# Patient Record
Sex: FEMALE | Race: WHITE | Employment: FULL TIME | URBAN - METROPOLITAN AREA
[De-identification: names, ages, dates, MRNs, and addresses within clinical notes are randomized per-mention and may not be internally consistent; named-entity substitution may affect disease eponyms.]

---

## 2020-07-19 ENCOUNTER — HOSPITAL ENCOUNTER (EMERGENCY)
Age: 45
Discharge: HOME | End: 2020-07-19
Attending: EMERGENCY MEDICINE
Payer: COMMERCIAL

## 2020-07-19 VITALS
HEART RATE: 88 BPM | TEMPERATURE: 98.8 F | RESPIRATION RATE: 16 BRPM | DIASTOLIC BLOOD PRESSURE: 94 MMHG | SYSTOLIC BLOOD PRESSURE: 146 MMHG | OXYGEN SATURATION: 99 % | HEIGHT: 66 IN | BODY MASS INDEX: 34.87 KG/M2 | WEIGHT: 217 LBS

## 2020-07-19 LAB
A/G RATIO: 1.8 (ref 1.1–2.2)
ALBUMIN SERPL-MCNC: 4.6 G/DL (ref 3.4–5)
ALP BLD-CCNC: 56 U/L (ref 40–129)
ALT SERPL-CCNC: 10 U/L (ref 10–40)
AMORPHOUS: ABNORMAL /HPF
ANION GAP SERPL CALCULATED.3IONS-SCNC: 9 MMOL/L (ref 3–16)
AST SERPL-CCNC: 11 U/L (ref 15–37)
BACTERIA: ABNORMAL /HPF
BASOPHILS ABSOLUTE: 0 K/UL (ref 0–0.2)
BASOPHILS RELATIVE PERCENT: 0.5 %
BILIRUB SERPL-MCNC: 1.1 MG/DL (ref 0–1)
BILIRUBIN URINE: NEGATIVE
BLOOD, URINE: ABNORMAL
BUN BLDV-MCNC: 10 MG/DL (ref 7–20)
CALCIUM SERPL-MCNC: 9.2 MG/DL (ref 8.3–10.6)
CHLORIDE BLD-SCNC: 105 MMOL/L (ref 99–110)
CLARITY: ABNORMAL
CO2: 24 MMOL/L (ref 21–32)
COLOR: ABNORMAL
CREAT SERPL-MCNC: 0.8 MG/DL (ref 0.6–1.1)
EOSINOPHILS ABSOLUTE: 0 K/UL (ref 0–0.6)
EOSINOPHILS RELATIVE PERCENT: 0.7 %
EPITHELIAL CELLS, UA: ABNORMAL /HPF (ref 0–5)
FINE CASTS, UA: ABNORMAL /LPF (ref 0–2)
GFR AFRICAN AMERICAN: >60
GFR NON-AFRICAN AMERICAN: >60
GLOBULIN: 2.5 G/DL
GLUCOSE BLD-MCNC: 111 MG/DL (ref 70–99)
GLUCOSE URINE: NEGATIVE MG/DL
HCG QUALITATIVE: NEGATIVE
HCT VFR BLD CALC: 40.3 % (ref 36–48)
HEMOGLOBIN: 13.3 G/DL (ref 12–16)
KETONES, URINE: NEGATIVE MG/DL
LEUKOCYTE ESTERASE, URINE: ABNORMAL
LIPASE: 26 U/L (ref 13–60)
LYMPHOCYTES ABSOLUTE: 0.9 K/UL (ref 1–5.1)
LYMPHOCYTES RELATIVE PERCENT: 16.5 %
MCH RBC QN AUTO: 25.9 PG (ref 26–34)
MCHC RBC AUTO-ENTMCNC: 32.9 G/DL (ref 31–36)
MCV RBC AUTO: 78.7 FL (ref 80–100)
MICROSCOPIC EXAMINATION: YES
MONOCYTES ABSOLUTE: 0.4 K/UL (ref 0–1.3)
MONOCYTES RELATIVE PERCENT: 6.8 %
MUCUS: ABNORMAL /LPF
NEUTROPHILS ABSOLUTE: 4.2 K/UL (ref 1.7–7.7)
NEUTROPHILS RELATIVE PERCENT: 75.5 %
NITRITE, URINE: NEGATIVE
PDW BLD-RTO: 13.6 % (ref 12.4–15.4)
PH UA: 5.5 (ref 5–8)
PLATELET # BLD: 233 K/UL (ref 135–450)
PMV BLD AUTO: 9.6 FL (ref 5–10.5)
POTASSIUM SERPL-SCNC: 3.8 MMOL/L (ref 3.5–5.1)
PROTEIN UA: NEGATIVE MG/DL
RBC # BLD: 5.12 M/UL (ref 4–5.2)
RBC UA: ABNORMAL /HPF (ref 0–4)
SODIUM BLD-SCNC: 138 MMOL/L (ref 136–145)
SPECIFIC GRAVITY UA: >=1.03 (ref 1–1.03)
TOTAL PROTEIN: 7.1 G/DL (ref 6.4–8.2)
URINE TYPE: ABNORMAL
UROBILINOGEN, URINE: 0.2 E.U./DL
WBC # BLD: 5.6 K/UL (ref 4–11)
WBC UA: ABNORMAL /HPF (ref 0–5)

## 2020-07-19 PROCEDURE — 84703 CHORIONIC GONADOTROPIN ASSAY: CPT

## 2020-07-19 PROCEDURE — 85025 COMPLETE CBC W/AUTO DIFF WBC: CPT

## 2020-07-19 PROCEDURE — 51798 US URINE CAPACITY MEASURE: CPT

## 2020-07-19 PROCEDURE — 80053 COMPREHEN METABOLIC PANEL: CPT

## 2020-07-19 PROCEDURE — 99283 EMERGENCY DEPT VISIT LOW MDM: CPT

## 2020-07-19 PROCEDURE — 83690 ASSAY OF LIPASE: CPT

## 2020-07-19 PROCEDURE — 81001 URINALYSIS AUTO W/SCOPE: CPT

## 2020-07-19 RX ORDER — LOSARTAN POTASSIUM 25 MG/1
25 TABLET ORAL DAILY
COMMUNITY

## 2020-07-19 RX ORDER — DICYCLOMINE HCL 20 MG
20 TABLET ORAL ONCE
Status: DISCONTINUED | OUTPATIENT
Start: 2020-07-19 | End: 2020-07-19 | Stop reason: HOSPADM

## 2020-07-19 RX ORDER — DICYCLOMINE HCL 20 MG
20 TABLET ORAL EVERY 6 HOURS
Qty: 40 TABLET | Refills: 0 | Status: SHIPPED | OUTPATIENT
Start: 2020-07-19

## 2020-07-19 ASSESSMENT — PAIN SCALES - GENERAL: PAINLEVEL_OUTOF10: 5

## 2020-07-19 ASSESSMENT — PAIN DESCRIPTION - ORIENTATION: ORIENTATION: UPPER

## 2020-07-19 ASSESSMENT — PAIN DESCRIPTION - PAIN TYPE: TYPE: ACUTE PAIN

## 2020-07-19 ASSESSMENT — PAIN DESCRIPTION - LOCATION: LOCATION: ABDOMEN

## 2020-07-19 NOTE — ED PROVIDER NOTES
CHIEF COMPLAINT  Abdominal Pain (sx since Thursday); Nausea; and Urinary Frequency      HISTORY OF PRESENT ILLNESS  Irma Mcallister is a 45 y.o. female with a history of hypertension who presents to the ED complaining of abdominal pain. Patient reports that since Thursday she has noted some nausea and urinary frequency. Patient describes pain in the suprapubic region as well as upper lateral aspects. No fevers, chills, or sweats. No diarrhea. Patient denies any vaginal complaints. .   No other complaints, modifying factors or associated symptoms. I have reviewed the following from the nursing documentation. Past Medical History:   Diagnosis Date    Hypertension      Past Surgical History:   Procedure Laterality Date    WISDOM TOOTH EXTRACTION       History reviewed. No pertinent family history.   Social History     Socioeconomic History    Marital status: Single     Spouse name: Not on file    Number of children: Not on file    Years of education: Not on file    Highest education level: Not on file   Occupational History    Not on file   Social Needs    Financial resource strain: Not on file    Food insecurity     Worry: Not on file     Inability: Not on file    Transportation needs     Medical: Not on file     Non-medical: Not on file   Tobacco Use    Smoking status: Never Smoker    Smokeless tobacco: Never Used   Substance and Sexual Activity    Alcohol use: No    Drug use: No    Sexual activity: Not on file   Lifestyle    Physical activity     Days per week: Not on file     Minutes per session: Not on file    Stress: Not on file   Relationships    Social connections     Talks on phone: Not on file     Gets together: Not on file     Attends Hinduism service: Not on file     Active member of club or organization: Not on file     Attends meetings of clubs or organizations: Not on file     Relationship status: Not on file    Intimate partner violence     Fear of current or ex partner: Not on file     Emotionally abused: Not on file     Physically abused: Not on file     Forced sexual activity: Not on file   Other Topics Concern    Not on file   Social History Narrative    Not on file     Current Facility-Administered Medications   Medication Dose Route Frequency Provider Last Rate Last Dose    dicyclomine (BENTYL) tablet 20 mg  20 mg Oral Once Jerral Rung, DO         Current Outpatient Medications   Medication Sig Dispense Refill    losartan (COZAAR) 25 MG tablet Take 25 mg by mouth daily       Allergies   Allergen Reactions    Sulfa Antibiotics Nausea Only       REVIEW OF SYSTEMS  10 systems reviewed, pertinent positives per HPI otherwise noted to be negative. PHYSICAL EXAM  BP (!) 159/94   Pulse 91   Temp 98.8 °F (37.1 °C) (Oral)   Resp 16   Ht 5' 6\" (1.676 m)   Wt 217 lb (98.4 kg)   SpO2 99%   BMI 35.02 kg/m²   GENERAL APPEARANCE: Awake and alert. Cooperative. No acute distress. HEAD: Normocephalic. Atraumatic. EYES: PERRL. EOM's grossly intact. ENT: Mucous membranes are moist.   NECK: Supple, trachea midline. HEART: RRR. Normal S1S2, no rubs, gallops, or murmurs noted  LUNGS: Respirations unlabored. CTAB. Good air exchange. No wheezes, rales, or rhonchi. Speaking comfortably in full sentences. ABDOMEN: Soft. Non-distended. Non-tender. No CVA tenderness. No guarding or rebound. Normal bowel sounds. EXTREMITIES: No peripheral edema. MAEE. No acute deformities. SKIN: Warm and dry. No acute rashes. NEUROLOGICAL: Alert and oriented X 3. CN II-XII intact. No gross facial drooping. Strength 5/5, sensation intact. Normal coordination. No pronator drift. Gait normal.   PSYCHIATRIC: Normal mood and affect. LABS  I have reviewed all labs for this visit. Results for orders placed or performed during the hospital encounter of 07/19/20   Urinalysis   Result Value Ref Range    Urine Type NotGiven              Rechecks: Physical assessment performed.       1151: Patient with minimal symptoms at this time. Bladder scan: Less than 100 cc post void. ED COURSE/MDM  Patient seen and evaluated. Old records reviewed. Labs and imaging reviewed and results discussed with patient. Patient was offered Bentyl, but declined. She remained stable throughout her stay in the emergency department. Patient was reassessed as noted above . No acute pathology was noted and pt is safe for discharge home to follow up with primary care, and urology. .  Patient's labs including urinalysis are stable. I discussed potential imaging with patient. We agreed that at this time imaging would not be warranted and she does not desire formal imaging either way. Patient agrees to follow-up with PCP/urology plan of care discussed with patient and family. Patient and family in agreement with plan. Patient was given scripts for the following medications. I counseled patient how to take these medications. Discharge Medication List as of 7/19/2020 11:28 AM      START taking these medications    Details   dicyclomine (BENTYL) 20 MG tablet Take 1 tablet by mouth every 6 hours, Disp-40 tablet,R-0Print             CLINICAL IMPRESSION  1. Abdominal pain, unspecified abdominal location    2. Urinary urgency    3. Elevated blood pressure reading        Blood pressure (!) 159/94, pulse 91, temperature 98.8 °F (37.1 °C), temperature source Oral, resp. rate 16, height 5' 6\" (1.676 m), weight 217 lb (98.4 kg), SpO2 99 %. Aure Ayala was discharged to home in stable condition.         Luis Angel Alvarez,   07/19/20 [id number]

## 2024-01-22 LAB
BASOPHILS ABSOLUTE: 0 THOU/MCL (ref 0–0.2)
BASOPHILS ABSOLUTE: 1 %
C-REACTIVE PROTEIN WIDE RANGE: <3 MG/L
EOSINOPHILS ABSOLUTE: 0.1 THOU/MCL (ref 0.03–0.45)
EOSINOPHILS RELATIVE PERCENT: 1 %
FERRITIN: 14 NG/ML (ref 13–150)
FOLATE: >16 NG/ML
HCT VFR BLD CALC: 41 % (ref 36–46)
HEMOGLOBIN: 13.7 G/DL (ref 12–15.2)
IRON % SATURATION: 26 % (ref 20–50)
IRON: 111 MCG/DL (ref 30–160)
LYMPHOCYTES ABSOLUTE: 1.3 THOU/MCL (ref 1–4)
LYMPHOCYTES RELATIVE PERCENT: 18 %
Lab: 6.3 % (ref 3.1–17.6)
MCH RBC QN AUTO: 25.7 PG (ref 27–33)
MCHC RBC AUTO-ENTMCNC: 33.3 G/DL (ref 32–36)
MCV RBC AUTO: 77.2 FL (ref 82–97)
MONOCYTES # BLD: 5 %
MONOCYTES ABSOLUTE: 0.4 THOU/MCL (ref 0.2–0.9)
NEUTROPHILS ABSOLUTE: 5.3 THOU/MCL (ref 1.8–7.7)
PDW BLD-RTO: 16 % (ref 12.3–17)
PLATELET # BLD: 199 THOU/MCL (ref 140–375)
PMV BLD AUTO: 8.8 FL (ref 7.4–11.5)
RBC # BLD: 5.32 MIL/MCL (ref 3.8–5.2)
RETIC HEMOGLOBIN: 31 PG (ref 28–38)
RETICULOCYTE ABSOLUTE COUNT: 0.9 % (ref 0.9–2.5)
RETICULOCYTE ABSOLUTE COUNT: 47 THOU/MCL (ref 40–110)
SEG NEUTROPHILS: 75 %
TOTAL IRON BINDING CAPACITY: 423 MCG/DL (ref 250–400)
TRANSFERRIN: 302 MG/DL (ref 200–360)
VITAMIN B-12: 456 PG/ML (ref 232–1245)
VITAMIN D 25-HYDROXY: 9.2 NG/ML (ref 30–150)
WBC # BLD: 7.1 THOU/MCL (ref 3.6–10.5)

## 2024-03-04 ENCOUNTER — HOSPITAL ENCOUNTER (OUTPATIENT)
Dept: PHYSICAL THERAPY | Age: 49
Setting detail: THERAPIES SERIES
Discharge: HOME | End: 2024-03-04
Payer: COMMERCIAL

## 2024-03-04 PROCEDURE — 97162 PT EVAL MOD COMPLEX 30 MIN: CPT

## 2024-03-04 NOTE — THERAPY EVALUATION
[facility]- Outpatient Rehabilitation and Therapy  [address] [place] [address] office: [phone] fax: [phone]     Physical Therapy Certification      Dear Julianne Guerin,     We had the pleasure of evaluating the following patient for physical therapy services at [facility].  A summary of our findings can be found in the initial assessment below.  This includes our plan of care.  If you have any questions or concerns regarding these findings, please do not hesitate to contact me at the office phone number listed above.  Thank you for the referral.     Physician Signature:_______________________________Date:__________________  By signing above (or electronic signature), therapist’s plan is approved by physician       Initial Evaluation   Patient: Irma Cavanaugh (48 y.o. female)   Examination Date: 2024   :  1975 MRN: [id number]   Visit #: 1    Insurance: Payor: [employer] / Plan: [facility] SHARED SERVICES / Product Type: *No Product type* /   Insurance ID: [id number] - (Commercial)  Secondary Insurance (if applicable):    Treatment Diagnosis: R K' Pain     Medical Diagnosis:  Pain in right knee [M25.561]  Other chronic pain [G89.29]   Referring Physician: Julianne Guerin, *  PCP: Cheyenne Garcia MD                              Precautions/ Contra-indications:           Latex allergy:  NO  Pacemaker:    NO  Contraindications for Manipulation: None  Date of Surgery: N/A  Other:     Red Flags:  None    C-SSRS Triggered by Intake questionnaire:   [x] No, Questionnaire did not trigger screening.   [] Yes, Patient intake triggered further evaluation      [] C-SSRS Screening completed  [] PCP notified via Plan of Care  [] Emergency services notified     Preferred Language for Healthcare:   [x] English       [] other:    SUBJECTIVE EXAMINATION     Patient stated complaint: patient has been having R knee pain for years

## 2024-03-04 NOTE — FLOWSHEET NOTE
[facility]- Outpatient Rehabilitation and Therapy  [address], [place] [address] office: [phone] fax: [phone]      Physical Therapy: TREATMENT/PROGRESS NOTE   Patient: Irma Cavanaugh (48 y.o. female)   Treatment Date: 2024   :  1975 MRN: [id number]   Visit #: 1   Insurance Allowable Auth Needed   12 []Yes    []No    Insurance: Payor: [employer] / Plan: [facility] SHARED SERVICES / Product Type: *No Product type* /   Insurance ID: [id number] - (Commercial)  Secondary Insurance (if applicable):    Treatment Diagnosis: R K' pain   Medical Diagnosis:    Pain in right knee [M25.561]  Other chronic pain [G89.29]   Referring Physician: Julianne Guerin, *  PCP: Cheyenne Garcia MD                             Plan of care signed: NO    Date of Patient follow up with Physician:      Progress Report/POC: EVAL today  POC update due: (10 visits /OR AUTH LIMITS, whichever is less) 10 4/3/2024     Precautions/ Contra-indications:                                                                                          Latex allergy:  NO  Pacemaker:    NO  Contraindications for Manipulation: None  Date of Surgery: N/A  Other:      Red Flags:  None    Preferred Language for Healthcare:   [x]English       []other:    SUBJECTIVE EXAMINATION     Patient Report/Comments: patient has been having R knee pain for years (~10-15). She used to only experience pain after exercise but her symptoms have progressed to constant pain. As a result, she is not exercising at all right now because it is too painful. Patient notes gait deviations to cope with the pain. Patient has had PT for this knee in the past but it did not resolve.She reports weakness in the R leg causing her to feel \"shaky\" . Standing has become more difficult and causes fatigue. She is limited to about 10-15 minutes before pain and fatigue intensifies. Sitting is not limited by pain. Walking is almost

## 2024-03-07 ENCOUNTER — HOSPITAL ENCOUNTER (OUTPATIENT)
Dept: PHYSICAL THERAPY | Age: 49
Setting detail: THERAPIES SERIES
Discharge: HOME | End: 2024-03-07
Payer: COMMERCIAL

## 2024-03-07 PROCEDURE — 97110 THERAPEUTIC EXERCISES: CPT

## 2024-03-07 PROCEDURE — 97140 MANUAL THERAPY 1/> REGIONS: CPT

## 2024-03-07 PROCEDURE — 97112 NEUROMUSCULAR REEDUCATION: CPT

## 2024-03-07 NOTE — FLOWSHEET NOTE
goals for Patient:   Short Term Goals: To be achieved in: 2 weeks  Independent in HEP and progression per patient tolerance, in order to progress toward full function and prevent re-injury.               Status: [] Progressing: [] Met: [] Not Met: [] Adjusted  Patient will have a decrease in pain to 1/10 to help facilitate improvement in movement, function, and ADLs as indicated by functional deficits.              Status: [] Progressing: [] Met: [] Not Met: [] Adjusted     Long Term Goals: To be achieved in:  8-12  weeks  Disability index score of 15% or less for the LEFS to assist with return top prior level of function.                  Status: [] Progressing: [] Met: [] Not Met: [] Adjusted  Improve hip AROM ER and IR to WFL to allow for proper joint functioning as indicated by patients functional deficits.  Status: [] Progressing: [] Met: [] Not Met: [] Adjusted  3. Pt to improve strength to 4-/5 or better of hip abductors and external rotators to allow for proper muscle and joint use in functional mobility, ADLs and prior level of function  Status: [] Progressing: [] Met: [] Not Met: [] Adjusted  4. Patient will be able to walk for at least 20 minute without increased pain or fatigue to work towards return to prior level of function.  Status: [] Progressing: [] Met: [] Not Met: [] Adjusted  5. Patient will return to a personal exercise program without increased symptoms and with carryover of therapeutic interventions to promote long-term improvement.                                                                                                                   Status: [] Progressing: [] Met: [] Not Met: [] Adjusted       Overall Progression Towards Functional goals/ Treatment Progress Update:  [] Patient is progressing as expected towards functional goals listed.    [] Progression is slowed due to complexities/Impairments listed.  [] Progression has been slowed due to co-morbidities.  [x] Plan just

## 2024-03-11 ENCOUNTER — HOSPITAL ENCOUNTER (OUTPATIENT)
Dept: PHYSICAL THERAPY | Age: 49
Setting detail: THERAPIES SERIES
Discharge: HOME | End: 2024-03-11
Payer: COMMERCIAL

## 2024-03-11 PROCEDURE — 97110 THERAPEUTIC EXERCISES: CPT

## 2024-03-11 PROCEDURE — 97140 MANUAL THERAPY 1/> REGIONS: CPT

## 2024-03-11 PROCEDURE — 97112 NEUROMUSCULAR REEDUCATION: CPT

## 2024-03-11 NOTE — FLOWSHEET NOTE
hip extension    HEP                  TG  NV            Manual Intervention (02227)  TIME     B Tibiofemoral distraction; oscillations; IR/ER mobs.  EOB distraction, IR/ER mobs.  Patellar mobs R.  20 mins                                 NMR re-education (16092) resistance Sets/time Reps CUES NEEDED   Hip IR/ER                                   Therapeutic Activity (89992)  Sets/time                                          Modalities:    No modalities applied this session    Education/Home Exercise Program: TA set + marching + BKFO; bridging; clamshells      ASSESSMENT     Today's Assessment:  Pt takes more time with exercises in order to feel the correct muscles working. However, she has a great understanding of the exercises and is able to make improvements in strength and progressions in intensity.     Medical Necessity Documentation:  I certify that this patient meets the below criteria necessary for medical necessity for care and/or justification of therapy services:  The patient has functional impairments and/or activity limitations and would benefit from continued outpatient therapy services to address the deficits outlined in the patients goals  The patient has a musculoskeletal condition(s) with a corresponding ICD-10 code that is of complexity and severity that require skilled therapeutic intervention. This has a direct and significant impact on the need for therapy and significantly impacts the rate of recovery.     Treatment/Activity Tolerance:  [x] Patient tolerated treatment well [] Patient limited by fatique  [] Patient limited by pain  [] Patient limited by other medical complications  [] Other:     Return to Play: NA    Prognosis for POC: [x] Good [] Fair  [] Poor    Patient requires continued skilled intervention: [x] Yes  [] No    GOALS     Patient stated goal: Return to walking without compensation and return to personal exercise.   Status: [] Progressing: [] Met: [] Not Met: [] Adjusted

## 2024-03-14 ENCOUNTER — HOSPITAL ENCOUNTER (OUTPATIENT)
Dept: PHYSICAL THERAPY | Age: 49
Setting detail: THERAPIES SERIES
Discharge: HOME | End: 2024-03-14
Payer: COMMERCIAL

## 2024-03-14 PROCEDURE — 97110 THERAPEUTIC EXERCISES: CPT

## 2024-03-14 PROCEDURE — 97112 NEUROMUSCULAR REEDUCATION: CPT

## 2024-03-14 PROCEDURE — 97140 MANUAL THERAPY 1/> REGIONS: CPT

## 2024-03-14 NOTE — FLOWSHEET NOTE
[facility]- Outpatient Rehabilitation and Therapy  [address], [place] [address] office: [phone] fax: [phone]      Physical Therapy: TREATMENT/PROGRESS NOTE   Patient: Irma Cavanaugh (48 y.o. female)   Treatment Date: 2024   :  1975 MRN: [id number]   Visit #: 4   Insurance Allowable Auth Needed   12 []Yes    [x]No    Insurance: Payor: [employer] / Plan: [facility] SHARED SERVICES / Product Type: *No Product type* /   Insurance ID: [id number] - (Commercial)  Secondary Insurance (if applicable):    Treatment Diagnosis: R K' pain   Medical Diagnosis:    Pain in right knee [M25.561]  Other chronic pain [G89.29]   Referring Physician: Julianne Guerin, *  PCP: Cheyenne Garcia MD                             Plan of care signed: NO    Date of Patient follow up with Physician:      Progress Report/POC: EVAL today  POC update due: (10 visits /OR AUTH LIMITS, whichever is less) 10 2024     Precautions/ Contra-indications:                                                                                          Latex allergy:  NO  Pacemaker:    NO  Contraindications for Manipulation: None  Date of Surgery: N/A  Other:      Red Flags:  None    Preferred Language for Healthcare:   [x]English       []other:    SUBJECTIVE EXAMINATION     Patient Report/Comments: patient has some soreness in her R leg due to walking in the woods briefly since last session. Otherwise she feels okay.      Test used Initial score  3/4/24   Pain Summary VAS 3-7/10   Functional questionnaire LEFS 29%   Other:              OBJECTIVE EXAMINATION     Observation: See Eval    Test measurements: See Eval    Exercises/Interventions:     Therapeutic Ex (77651)  resistance Sets/time Reps Notes/Cues/Progressions   TA set      Marching      BKFO     10  10 HEP    Bridge  10x5 secs   HEp    Clamshell   15 ea    Mini squat  2 10 Dowel kelton training   Prone hip extension   10 ea  HEP            
What Is The Reason For Today's Visit?: Full Body Skin Examination
What Is The Reason For Today's Visit? (Being Monitored For X): concerning skin lesions on an annual basis
How Severe Are Your Spot(S)?: mild

## 2024-03-18 ENCOUNTER — HOSPITAL ENCOUNTER (OUTPATIENT)
Dept: PHYSICAL THERAPY | Age: 49
Setting detail: THERAPIES SERIES
Discharge: HOME | End: 2024-03-18
Payer: COMMERCIAL

## 2024-03-18 PROCEDURE — 97140 MANUAL THERAPY 1/> REGIONS: CPT

## 2024-03-18 PROCEDURE — 97112 NEUROMUSCULAR REEDUCATION: CPT

## 2024-03-18 PROCEDURE — 97110 THERAPEUTIC EXERCISES: CPT

## 2024-03-21 ENCOUNTER — HOSPITAL ENCOUNTER (OUTPATIENT)
Dept: PHYSICAL THERAPY | Age: 49
Setting detail: THERAPIES SERIES
Discharge: HOME | End: 2024-03-21
Payer: COMMERCIAL

## 2024-03-21 PROCEDURE — 97140 MANUAL THERAPY 1/> REGIONS: CPT

## 2024-03-21 PROCEDURE — 97112 NEUROMUSCULAR REEDUCATION: CPT

## 2024-03-21 PROCEDURE — 97110 THERAPEUTIC EXERCISES: CPT

## 2024-03-21 NOTE — FLOWSHEET NOTE
[facility]- Outpatient Rehabilitation and Therapy  [address], [place] [address] office: [phone] fax: [phone]      Physical Therapy: TREATMENT/PROGRESS NOTE   Patient: Irma Cavanaugh (48 y.o. female)   Treatment Date: 2024   :  1975 MRN: [id number]   Visit #: 6   Insurance Allowable Auth Needed   12 []Yes    [x]No    Insurance: Payor: [employer] / Plan: [facility] SHARED SERVICES / Product Type: *No Product type* /   Insurance ID: [id number] - (Commercial)  Secondary Insurance (if applicable):    Treatment Diagnosis: R K' pain   Medical Diagnosis:    Pain in right knee [M25.561]  Other chronic pain [G89.29]   Referring Physician: Julianne Guerin, *  PCP: Cheyenne Garcia MD                             Plan of care signed: NO    Date of Patient follow up with Physician:      Progress Report/POC: EVAL today  POC update due: (10 visits /OR AUTH LIMITS, whichever is less) 10 2024     Precautions/ Contra-indications:                                                                                          Latex allergy:  NO  Pacemaker:    NO  Contraindications for Manipulation: None  Date of Surgery: N/A  Other:      Red Flags:  None    Preferred Language for Healthcare:   [x]English       []other:    SUBJECTIVE EXAMINATION     Patient Report/Comments: patient reports doing much better than last Monday. Soreness has significantly decreased.     Test used Initial score  3/4/24   Pain Summary VAS 3-7/10   Functional questionnaire LEFS 29%   Other:              OBJECTIVE EXAMINATION     Observation: See Eval    Test measurements: See Eval    Exercises/Interventions:     Therapeutic Ex (52746)  resistance Sets/time Reps Notes/Cues/Progressions   TA set      Marching      BKFO     10  10 HEP    Bridge  10x5 secs   HEp + abd + red band   Clamshell   15 ea    Mini squat   10  5 Dowel kelton training; bow  squat   Prone hip extension   10 HEP    Resisted

## 2024-03-25 ENCOUNTER — HOSPITAL ENCOUNTER (OUTPATIENT)
Dept: PHYSICAL THERAPY | Age: 49
Setting detail: THERAPIES SERIES
Discharge: HOME | End: 2024-03-25
Payer: COMMERCIAL

## 2024-03-25 PROCEDURE — 97112 NEUROMUSCULAR REEDUCATION: CPT

## 2024-03-25 PROCEDURE — 97140 MANUAL THERAPY 1/> REGIONS: CPT

## 2024-03-25 PROCEDURE — 97110 THERAPEUTIC EXERCISES: CPT

## 2024-03-25 NOTE — FLOWSHEET NOTE
[facility]- Outpatient Rehabilitation and Therapy  [address], [place] [address] office: [phone] fax: [phone]      Physical Therapy: TREATMENT/PROGRESS NOTE   Patient: Irma Cavanaugh (48 y.o. female)   Treatment Date: 2024   :  1975 MRN: [id number]   Visit #: 7   Insurance Allowable Auth Needed   12 []Yes    [x]No    Insurance: Payor: [employer] / Plan: [facility] SHARED SERVICES / Product Type: *No Product type* /   Insurance ID: [id number] - (Commercial)  Secondary Insurance (if applicable):    Treatment Diagnosis: R K' pain   Medical Diagnosis:    Pain in right knee [M25.561]  Other chronic pain [G89.29]   Referring Physician: Julianne Guerin, *  PCP: Cheyenne Garcia MD                             Plan of care signed: NO    Date of Patient follow up with Physician:      Progress Report/POC: EVAL today  POC update due: (10 visits /OR AUTH LIMITS, whichever is less) 10 2024     Precautions/ Contra-indications:                                                                                          Latex allergy:  NO  Pacemaker:    NO  Contraindications for Manipulation: None  Date of Surgery: N/A  Other:      Red Flags:  None    Preferred Language for Healthcare:   [x]English       []other:    SUBJECTIVE EXAMINATION     Patient Report/Comments: Pt reports doing pretty good but was tight over the weekend since the therapy.  Pt did a convention and walk/stood a couple of hours with resulting increased in pain in the R distal adductor/medial quad above the knee.      Test used Initial score  3/4/24 3/25/23   Pain Summary VAS 3-7/10 3/10   Functional questionnaire LEFS 29%    Other:                OBJECTIVE EXAMINATION     Observation:     Test measurements: See Eval    Exercises/Interventions:     Therapeutic Ex (18260)  resistance Sets/time Reps Notes/Cues/Progressions   TA set      Marching      BKFO    HEP    Bridge    HEp + abd + red band

## 2024-03-28 ENCOUNTER — HOSPITAL ENCOUNTER (OUTPATIENT)
Dept: PHYSICAL THERAPY | Age: 49
Setting detail: THERAPIES SERIES
Discharge: HOME | End: 2024-03-28
Payer: COMMERCIAL

## 2024-03-28 PROCEDURE — 97110 THERAPEUTIC EXERCISES: CPT

## 2024-03-28 PROCEDURE — 97112 NEUROMUSCULAR REEDUCATION: CPT

## 2024-03-28 PROCEDURE — 97140 MANUAL THERAPY 1/> REGIONS: CPT

## 2024-03-28 NOTE — FLOWSHEET NOTE
[facility]- Outpatient Rehabilitation and Therapy  [address], [place] [address] office: [phone] fax: [phone]      Physical Therapy: TREATMENT/PROGRESS NOTE   Patient: Irma Cavanaugh (48 y.o. female)   Treatment Date: 2024   :  1975 MRN: [id number]   Visit #: 8   Insurance Allowable Auth Needed   12 []Yes    [x]No    Insurance: Payor: [employer] / Plan: [facility] SHARED SERVICES / Product Type: *No Product type* /   Insurance ID: [id number] - (Commercial)  Secondary Insurance (if applicable):    Treatment Diagnosis: R K' pain   Medical Diagnosis:    Pain in right knee [M25.561]  Other chronic pain [G89.29]   Referring Physician: Julianne Guerin, *  PCP: Cheyenne Garcia MD                             Plan of care signed: NO    Date of Patient follow up with Physician:      Progress Report/POC: EVAL today  POC update due: (10 visits /OR AUTH LIMITS, whichever is less) 10 2024     Precautions/ Contra-indications:                                                                                          Latex allergy:  NO  Pacemaker:    NO  Contraindications for Manipulation: None  Date of Surgery: N/A  Other:      Red Flags:  None    Preferred Language for Healthcare:   [x]English       []other:    SUBJECTIVE EXAMINATION     Patient Report/Comments: Pt says her L knee has had infrequent bouts of pain since last visit but this is less frequent than it usually is. Overall, she has less stiffness and soreness after exercise.      Test used Initial score  3/4/24 3/25/23   Pain Summary VAS 3-710 3/10   Functional questionnaire LEFS 29%    Other:                OBJECTIVE EXAMINATION     Observation:     Test measurements: See Eval    Exercises/Interventions:     Therapeutic Ex (37126)  resistance Sets/time Reps Notes/Cues/Progressions   TA set      Marching      BKFO    HEP    Bridge    HEp + abd + red band   Clamshell   10 ea    Mini squat   10 Cue

## 2024-04-03 ENCOUNTER — HOSPITAL ENCOUNTER (OUTPATIENT)
Dept: PHYSICAL THERAPY | Age: 49
Setting detail: THERAPIES SERIES
Discharge: HOME | End: 2024-04-03
Payer: COMMERCIAL

## 2024-04-03 PROCEDURE — 97110 THERAPEUTIC EXERCISES: CPT

## 2024-04-03 PROCEDURE — 97112 NEUROMUSCULAR REEDUCATION: CPT

## 2024-04-03 PROCEDURE — 97140 MANUAL THERAPY 1/> REGIONS: CPT

## 2024-04-03 NOTE — FLOWSHEET NOTE
[facility]- Outpatient Rehabilitation and Therapy  [address], [place] [address] office: [phone] fax: [phone]      Physical Therapy: TREATMENT/PROGRESS NOTE   Patient: Irma Cavanaugh (48 y.o. female)   Treatment Date: 2024   :  1975 MRN: [id number]   Visit #: 9   Insurance Allowable Auth Needed   12 []Yes    [x]No    Insurance: Payor: [employer] / Plan: [facility] SHARED SERVICES / Product Type: *No Product type* /   Insurance ID: [id number] - (Commercial)  Secondary Insurance (if applicable):    Treatment Diagnosis: R K' pain   Medical Diagnosis:    Pain in right knee [M25.561]  Other chronic pain [G89.29]   Referring Physician: Julianne Guerin, *  PCP: Cheyenne Garcia MD                             Plan of care signed: NO    Date of Patient follow up with Physician:      Progress Report/POC: EVAL today  POC update due: (10 visits /OR AUTH LIMITS, whichever is less) 10 5/3/2024     Precautions/ Contra-indications:                                                                                          Latex allergy:  NO  Pacemaker:    NO  Contraindications for Manipulation: None  Date of Surgery: N/A  Other:      Red Flags:  None    Preferred Language for Healthcare:   [x]English       []other:    SUBJECTIVE EXAMINATION     Patient Report/Comments:  Pt reports feeling pretty good.  Knee is feeling much better.  Pt hasn't been walking longer distances so she hasn't tested it.       Test used Initial score  3/4/24 4/3/24   Pain Summary VAS 3-7/10 3/10   Functional questionnaire LEFS 29%    Other:                OBJECTIVE EXAMINATION     Observation:     Test measurements: See Eval    Exercises/Interventions:     Therapeutic Ex (22643)  resistance Sets/time Reps Notes/Cues/Progressions   3 way ball compression  5 secs  10 Forward and diagonal each   Bridge  5 secs 10 HEp + abd + red band   Clamshell supine alternating  5 secs 10 Green TB   Mini

## 2024-04-05 ENCOUNTER — HOSPITAL ENCOUNTER (OUTPATIENT)
Dept: PHYSICAL THERAPY | Age: 49
Setting detail: THERAPIES SERIES
Discharge: HOME | End: 2024-04-05
Payer: COMMERCIAL

## 2024-04-05 PROCEDURE — 97110 THERAPEUTIC EXERCISES: CPT

## 2024-04-05 PROCEDURE — 97112 NEUROMUSCULAR REEDUCATION: CPT

## 2024-04-05 PROCEDURE — 97140 MANUAL THERAPY 1/> REGIONS: CPT

## 2024-04-05 NOTE — FLOWSHEET NOTE
[facility]- [facility]  [address], [place] [address] office: [phone] fax: [phone]      Physical Therapy: TREATMENT/PROGRESS NOTE   Patient: Irma Cavanaugh (48 y.o. female)   Treatment Date: 2024   :  1975 MRN: [id number]   Visit #: 10   Insurance Allowable Auth Needed   12 []Yes    [x]No    Insurance: Payor: [employer] / Plan: [facility] SHARED SERVICES / Product Type: *No Product type* /   Insurance ID: [id number] - (Commercial)  Secondary Insurance (if applicable):    Treatment Diagnosis: R K' pain   Medical Diagnosis:    Pain in right knee [M25.561]  Other chronic pain [G89.29]   Referring Physician: Julianne Guerin, *  PCP: Cheyenne Garcia MD                             Plan of care signed: NO    Date of Patient follow up with Physician:      Progress Report/POC: EVAL today  POC update due: (10 visits /OR AUTH LIMITS, whichever is less) 10 5/3/2024     Precautions/ Contra-indications:                                                                                          Latex allergy:  NO  Pacemaker:    NO  Contraindications for Manipulation: None  Date of Surgery: N/A  Other:      Red Flags:  None    Preferred Language for Healthcare:   [x]English       []other:    SUBJECTIVE EXAMINATION     Patient Report/Comments:  Pt reports feeling pretty good.  Pt notes 'a little more soreness after last session' but nothing major.      Test used Initial score  3/4/24 45   Pain Summary VAS 3-7/10 210   Functional questionnaire LEFS 29%    Other:                OBJECTIVE EXAMINATION     Observation:     Test measurements:   Tenderness and fullness R QL  Hypomobility B hip joints, decreased B hip IR    Exercises/Interventions:     Therapeutic Ex (19399)  resistance Sets/time Reps Notes/Cues/Progressions   3 way ball compression  5 secs  10 Forward and diagonal each   Bridge  5 secs 10 Green TB    Clamshell supine

## 2024-04-08 ENCOUNTER — APPOINTMENT (OUTPATIENT)
Dept: PHYSICAL THERAPY | Age: 49
End: 2024-04-08
Payer: COMMERCIAL

## 2024-04-11 ENCOUNTER — APPOINTMENT (OUTPATIENT)
Dept: PHYSICAL THERAPY | Age: 49
End: 2024-04-11
Payer: COMMERCIAL

## 2024-04-24 ENCOUNTER — HOSPITAL ENCOUNTER (OUTPATIENT)
Dept: PHYSICAL THERAPY | Age: 49
Setting detail: THERAPIES SERIES
Discharge: HOME | End: 2024-04-24
Payer: COMMERCIAL

## 2024-04-24 PROCEDURE — 97112 NEUROMUSCULAR REEDUCATION: CPT

## 2024-04-24 PROCEDURE — 97140 MANUAL THERAPY 1/> REGIONS: CPT

## 2024-04-24 PROCEDURE — 97110 THERAPEUTIC EXERCISES: CPT

## 2024-04-24 NOTE — FLOWSHEET NOTE
[facility]- Outpatient Rehabilitation and Therapy  [address], [place] [address] office: [phone] fax: [phone]      Physical Therapy: TREATMENT/PROGRESS NOTE   Patient: Irma Cavanaugh (48 y.o. female)   Treatment Date: 2024   :  1975 MRN: [id number]   Visit #: 11   Insurance Allowable Auth Needed   12 []Yes    [x]No    Insurance: Payor: [employer] / Plan: [facility] SHARED SERVICES / Product Type: *No Product type* /   Insurance ID: [id number] - (Commercial)  Secondary Insurance (if applicable):    Treatment Diagnosis: R K' pain   Medical Diagnosis:    Pain in right knee [M25.561]  Other chronic pain [G89.29]   Referring Physician: Julianne Guerin, *  PCP: Cheyenne Garcia MD                             Plan of care signed: NO    Date of Patient follow up with Physician:      Progress Report/POC: EVAL today  POC update due: (10 visits /OR AUTH LIMITS, whichever is less) 10 2024     Precautions/ Contra-indications:                                                                                          Latex allergy:  NO  Pacemaker:    NO  Contraindications for Manipulation: None  Date of Surgery: N/A  Other:      Red Flags:  None    Preferred Language for Healthcare:   [x]English       []other:    SUBJECTIVE EXAMINATION     Patient Report/Comments:  Pt reports feeling pretty good.  Pt hasn't been to therapy for the past 3 weeks.  Pt traveled to [place] for 3 days without increased pain.  Pt states she did a 'fair' amount of walking and got 'tired' but no real pain.       Test used Initial score  3/4/24 4/24/24   Pain Summary VAS 3-7/10 2-3/10   Functional questionnaire LEFS 29% 31% disability    Other:                OBJECTIVE EXAMINATION     Observation:     Test measurements:   Tenderness and fullness R QL  Hypomobility B hip joints, decreased B hip IR    Exercises/Interventions:     Therapeutic Ex (99181)  resistance Sets/time Reps

## 2024-04-26 ENCOUNTER — HOSPITAL ENCOUNTER (OUTPATIENT)
Dept: PHYSICAL THERAPY | Age: 49
Setting detail: THERAPIES SERIES
Discharge: HOME | End: 2024-04-26
Payer: COMMERCIAL

## 2024-04-26 PROCEDURE — 97112 NEUROMUSCULAR REEDUCATION: CPT

## 2024-04-26 PROCEDURE — 97110 THERAPEUTIC EXERCISES: CPT

## 2024-04-26 PROCEDURE — 97140 MANUAL THERAPY 1/> REGIONS: CPT

## 2024-04-26 NOTE — FLOWSHEET NOTE
significantly impacts the rate of recovery.     Treatment/Activity Tolerance:  [x] Patient tolerated treatment well [] Patient limited by fatique  [] Patient limited by pain  [] Patient limited by other medical complications  [] Other:     Return to Play: NA    Prognosis for POC: [x] Good [] Fair  [] Poor    Patient requires continued skilled intervention: [x] Yes  [] No    GOALS     Patient stated goal: Return to walking without compensation and return to personal exercise.   Status: [] Progressing: [] Met: [] Not Met: [] Adjusted     Therapist goals for Patient:   Short Term Goals: To be achieved in: 2 weeks  Independent in HEP and progression per patient tolerance, in order to progress toward full function and prevent re-injury.               Status: [] Progressing: [] Met: [] Not Met: [] Adjusted  Patient will have a decrease in pain to 1/10 to help facilitate improvement in movement, function, and ADLs as indicated by functional deficits.              Status: [] Progressing: [] Met: [] Not Met: [] Adjusted     Long Term Goals: To be achieved in:  8-12  weeks  Disability index score of 15% or less for the LEFS to assist with return top prior level of function.                  Status: [] Progressing: [] Met: [] Not Met: [] Adjusted  Improve hip AROM ER and IR to WFL to allow for proper joint functioning as indicated by patients functional deficits.  Status: [] Progressing: [] Met: [] Not Met: [] Adjusted  3. Pt to improve strength to 4-/5 or better of hip abductors and external rotators to allow for proper muscle and joint use in functional mobility, ADLs and prior level of function  Status: [] Progressing: [] Met: [] Not Met: [] Adjusted  4. Patient will be able to walk for at least 20 minute without increased pain or fatigue to work towards return to prior level of function.  Status: [] Progressing: [] Met: [] Not Met: [] Adjusted  5. Patient will return to a personal exercise program without increased

## 2024-04-29 ENCOUNTER — HOSPITAL ENCOUNTER (OUTPATIENT)
Dept: PHYSICAL THERAPY | Age: 49
Setting detail: THERAPIES SERIES
Discharge: HOME | End: 2024-04-29
Payer: COMMERCIAL

## 2024-04-29 PROCEDURE — 97112 NEUROMUSCULAR REEDUCATION: CPT

## 2024-04-29 PROCEDURE — 97110 THERAPEUTIC EXERCISES: CPT

## 2024-04-29 PROCEDURE — 97140 MANUAL THERAPY 1/> REGIONS: CPT

## 2024-04-29 NOTE — FLOWSHEET NOTE
[facility]- Outpatient Rehabilitation and Therapy  [address], [place] [address] office: [phone] fax: [phone]      Physical Therapy: TREATMENT/PROGRESS NOTE   Patient: Irma Cavanaugh (48 y.o. female)   Treatment Date: 2024   :  1975 MRN: [id number]   Visit #: 13   Insurance Allowable Auth Needed   12 []Yes    [x]No    Insurance: Payor: [employer] / Plan: [facility] SHARED SERVICES / Product Type: *No Product type* /   Insurance ID: [id number] - (Commercial)  Secondary Insurance (if applicable):    Treatment Diagnosis: R K' pain   Medical Diagnosis:    Pain in right knee [M25.561]  Other chronic pain [G89.29]   Referring Physician: Julianne Guerin, *  PCP: Cheyenne Gracia MD                             Plan of care signed: NO    Date of Patient follow up with Physician:      Progress Report/POC: EVAL today  POC update due: (10 visits /OR AUTH LIMITS, whichever is less) 10 2024     Precautions/ Contra-indications:                                                                                          Latex allergy:  NO  Pacemaker:    NO  Contraindications for Manipulation: None  Date of Surgery: N/A  Other:      Red Flags:  None    Preferred Language for Healthcare:   [x]English       []other:    SUBJECTIVE EXAMINATION     Patient Report/Comments:  Pt reports feeling pretty good.  Really feeling better since last session.  Bruising on B hips noted yesterday with insidious onset.       Test used Initial score  3/4/24 4/24/24 4/26/24   Pain Summary VAS 3-7/10 2-3/10 0/10   Functional questionnaire LEFS 29% 31% disability     Other:                  OBJECTIVE EXAMINATION     Observation:     Test measurements:   Tenderness and fullness R QL  Hypomobility B hip joints, decreased B hip IR    Exercises/Interventions:     Therapeutic Ex (27925)  resistance Sets/time Reps Notes/Cues/Progressions   3 way ball compression    Forward and diagonal each

## 2024-05-07 ENCOUNTER — HOSPITAL ENCOUNTER (OUTPATIENT)
Dept: PHYSICAL THERAPY | Age: 49
Setting detail: THERAPIES SERIES
Discharge: HOME | End: 2024-05-07
Payer: COMMERCIAL

## 2024-05-07 PROCEDURE — 97112 NEUROMUSCULAR REEDUCATION: CPT

## 2024-05-07 PROCEDURE — 97110 THERAPEUTIC EXERCISES: CPT

## 2024-05-07 PROCEDURE — 97140 MANUAL THERAPY 1/> REGIONS: CPT

## 2024-05-07 NOTE — FLOWSHEET NOTE
[facility]- Outpatient Rehabilitation and Therapy  [address], [place] [address] office: [phone] fax: [phone]      Physical Therapy: TREATMENT/PROGRESS NOTE   Patient: Irma Cavanaugh (48 y.o. female)   Treatment Date: 2024   :  1975 MRN: [id number]   Visit #: 14   Insurance Allowable Auth Needed   12 []Yes    [x]No    Insurance: Payor: [employer] / Plan: [facility] SHARED SERVICES / Product Type: *No Product type* /   Insurance ID: [id number] - (Commercial)  Secondary Insurance (if applicable):    Treatment Diagnosis: R K' pain   Medical Diagnosis:    Pain in right knee [M25.561]  Other chronic pain [G89.29]   Referring Physician: Julianne Guerin, *  PCP: Cheyenne Garcia MD                             Plan of care signed: NO    Date of Patient follow up with Physician:      Progress Report/POC: EVAL today  POC update due: (10 visits /OR AUTH LIMITS, whichever is less) 10 2024     Precautions/ Contra-indications:                                                                                          Latex allergy:  NO  Pacemaker:    NO  Contraindications for Manipulation: None  Date of Surgery: N/A  Other:      Red Flags:  None    Preferred Language for Healthcare:   [x]English       []other:    SUBJECTIVE EXAMINATION     Patient Report/Comments:  Pt reports feeling pretty good.  B hip bruising has gone away since last session.  Pt notes doing her 'walking exercise on line' has resulted in increased joint pain knees.  Pt states her work is picking up so she will be much busier.         Test used Initial score  3/4/24 4/24/24 5/7/24   Pain Summary VAS 3-7/10 2-3/10 0/10   Functional questionnaire LEFS 29% 31% disability     Other:                  OBJECTIVE EXAMINATION     Observation:     Test measurements:   Tenderness and fullness R QL  Hypomobility L hip joints, decreased L hip IR and ER     Exercises/Interventions:     Therapeutic Ex

## 2024-05-09 ENCOUNTER — APPOINTMENT (OUTPATIENT)
Dept: PHYSICAL THERAPY | Age: 49
End: 2024-05-09
Payer: COMMERCIAL

## 2024-05-13 ENCOUNTER — HOSPITAL ENCOUNTER (OUTPATIENT)
Dept: PHYSICAL THERAPY | Age: 49
Setting detail: THERAPIES SERIES
Discharge: HOME | End: 2024-05-13
Payer: COMMERCIAL

## 2024-05-13 PROCEDURE — 97112 NEUROMUSCULAR REEDUCATION: CPT

## 2024-05-13 PROCEDURE — 97110 THERAPEUTIC EXERCISES: CPT

## 2024-05-13 PROCEDURE — 97140 MANUAL THERAPY 1/> REGIONS: CPT

## 2024-05-13 NOTE — FLOWSHEET NOTE
Clamshell supine alternating    Green TB   Leg press   80#  NV Cue force through heels   hip extension with reformer red spring    Cue force through heels   Hip stacking    Cue force through heels   Hip abd   Hip ext 45#  75# 2  2 10  10    Rocker board   NV F/B M/L and rocking each   Lumbo-pelvic stabilization        Resisted walking    Focus on triple extension   DKTC with feet on TB   LTR with feet on TB    20  20    TG  6'     Multifidus  S' extension    3 secs 15  10 Maroon TB   Maroon TB    Manual Intervention (98645)  TIME     B Tibiofemoral distraction; oscillations; IR/ER mobs.  EOB distraction, IR/ER mobs gr 3-4, M/L tibio-femoral joint mobs.  Patellar mobs R.  Prone lumbar rotation mobs, PA SP mobs        STM R QL followed by manual stretching  As needed     Lumbar gapping without cavitation gr 3-4  As needed     L hip joint mobs with straps followed by hip IR/ER neuro re-ed resisted training.  R femoral AP mobs with wedge gr 4  15 mins            NMR re-education (73584) resistance Sets/time Reps CUES NEEDED   Hip IR/ER  4 mins                                 Therapeutic Activity (85484)  Sets/time                                          Modalities:    No modalities applied this session    Education/Home Exercise Program: TA set + marching + BKFO; bridging; estelita      ASSESSMENT     Today's Assessment:  Pt tolerated progression of therex today without increased pain.  Good overall tolerance to therex today without increased pain.  Continue with 1x/wk.     Medical Necessity Documentation:  I certify that this patient meets the below criteria necessary for medical necessity for care and/or justification of therapy services:  The patient has functional impairments and/or activity limitations and would benefit from continued outpatient therapy services to address the deficits outlined in the patients goals  The patient has a musculoskeletal condition(s) with a corresponding ICD-10 code that is of

## 2024-05-16 ENCOUNTER — APPOINTMENT (OUTPATIENT)
Dept: PHYSICAL THERAPY | Age: 49
End: 2024-05-16
Payer: COMMERCIAL

## 2024-05-20 ENCOUNTER — HOSPITAL ENCOUNTER (OUTPATIENT)
Dept: PHYSICAL THERAPY | Age: 49
Setting detail: THERAPIES SERIES
Discharge: HOME | End: 2024-05-20
Payer: COMMERCIAL

## 2024-05-20 PROCEDURE — 97110 THERAPEUTIC EXERCISES: CPT

## 2024-05-20 PROCEDURE — 97140 MANUAL THERAPY 1/> REGIONS: CPT

## 2024-05-20 PROCEDURE — 97112 NEUROMUSCULAR REEDUCATION: CPT

## 2024-05-20 NOTE — PROGRESS NOTES
Biofeedback, Thermal Agents, Vasoneumatic Compression, Traction, Ultrasound, Iontophoresis, Paraffin, and Whirlpool  [x] Patient education on joint protection, postural re-education, activity modification, progression of HEP.        [] Aquatic Therapy     Electronically Signed by Dre Acosta, PT              Date: 05/20/2024

## 2024-05-20 NOTE — FLOWSHEET NOTE
without increased pain or fatigue to work towards return to prior level of function.  Status: [] Progressing: [x] Met: [] Not Met: [] Adjusted  5. Patient will return to a personal exercise program without increased symptoms and with carryover of therapeutic interventions to promote long-term improvement.                                                                                                                   Status: [x] Progressing: [] Met: [] Not Met: [] Adjusted       Overall Progression Towards Functional goals/ Treatment Progress Update:  [x] Patient is progressing as expected towards functional goals listed.    [] Progression is slowed due to complexities/Impairments listed.  [] Progression has been slowed due to co-morbidities.  [] Plan just implemented, too soon (<30days) to assess goals progression   [] Goals require adjustment due to lack of progress  [] Patient is not progressing as expected and requires additional follow up with physician  [] Other:     TREATMENT PLAN   Plan: Frequency/Duration: 2x/week for  8-12  weeks for the following treatment interventions:     Interventions:  [x] Therapeutic exercise including: strength training, ROM, including postural re-education.   [x] NMR activation and proprioception, including postural re-education.    [x] Manual therapy as indicated to include: PROM, Gr I-IV mobilizations, STM, Grade V Manipulation, and Dry Needling/IASTM  [x] Modalities as needed that may include: Cryotherapy, Electrical Stimulation, Biofeedback, Thermal Agents, Vasoneumatic Compression, Traction, Ultrasound, Iontophoresis, Paraffin, and Whirlpool  [x] Patient education on joint protection, postural re-education, activity modification, progression of HEP.        [] Aquatic Therapy     Electronically Signed by Dre Acosta PT              Date: 05/20/2024

## 2024-05-30 ENCOUNTER — HOSPITAL ENCOUNTER (OUTPATIENT)
Dept: PHYSICAL THERAPY | Age: 49
Setting detail: THERAPIES SERIES
Discharge: HOME | End: 2024-05-30
Payer: COMMERCIAL

## 2024-05-30 PROCEDURE — 97110 THERAPEUTIC EXERCISES: CPT

## 2024-05-30 PROCEDURE — 97112 NEUROMUSCULAR REEDUCATION: CPT

## 2024-05-30 PROCEDURE — 97140 MANUAL THERAPY 1/> REGIONS: CPT

## 2024-06-03 ENCOUNTER — APPOINTMENT (OUTPATIENT)
Dept: PHYSICAL THERAPY | Age: 49
End: 2024-06-03
Payer: COMMERCIAL

## 2024-06-10 ENCOUNTER — APPOINTMENT (OUTPATIENT)
Dept: PHYSICAL THERAPY | Age: 49
End: 2024-06-10
Payer: COMMERCIAL

## 2024-06-17 ENCOUNTER — HOSPITAL ENCOUNTER (OUTPATIENT)
Dept: PHYSICAL THERAPY | Age: 49
Setting detail: THERAPIES SERIES
Discharge: HOME | End: 2024-06-17
Payer: COMMERCIAL

## 2024-06-17 PROCEDURE — 97110 THERAPEUTIC EXERCISES: CPT

## 2024-06-17 PROCEDURE — 97112 NEUROMUSCULAR REEDUCATION: CPT

## 2024-06-17 PROCEDURE — 97140 MANUAL THERAPY 1/> REGIONS: CPT

## 2024-06-17 NOTE — FLOWSHEET NOTE
[facility]- Outpatient Rehabilitation and Therapy  [address], [place] [address] office: [phone] fax: [phone]      Physical Therapy: TREATMENT/PROGRESS NOTE   Patient: Irma Cavanaugh (48 y.o. female)   Treatment Date: 2024   :  1975 MRN: [id number]   Visit #: 18   Insurance Allowable Auth Needed   12 []Yes    [x]No    Insurance: Payor: [employer] / Plan: [facility] SHARED SERVICES / Product Type: *No Product type* /   Insurance ID: [id number] - (Commercial)  Secondary Insurance (if applicable):    Treatment Diagnosis: R K' pain   Medical Diagnosis:    Pain in right knee [M25.561]  Other chronic pain [G89.29]   Referring Physician: Julianne Guerin, *  PCP: Cheyenne Garcia MD                             Plan of care signed: NO    Date of Patient follow up with Physician:      Progress Report/POC: EVAL today  POC update due: (10 visits /OR AUTH LIMITS, whichever is less) 10 2024     Precautions/ Contra-indications:                                                                                          Latex allergy:  NO  Pacemaker:    NO  Contraindications for Manipulation: None  Date of Surgery: N/A  Other:      Red Flags:  None    Preferred Language for Healthcare:   [x]English       []other:    SUBJECTIVE EXAMINATION     Patient Report/Comments:  Pt reports that she is feeling 'better' since her last session and has been experiencing decreased knee pain. Pt has not been able to perform HEP regularly d/t death in the family.     Test used Initial score  3/4/24 4/24/24 5/20/24 6/17/24   Pain Summary VAS 3-7/10 2-310 0-1/10 1-2/10   Functional questionnaire LEFS 29% 31% disability  7.5% disability    Other:                    OBJECTIVE EXAMINATION     Observation:     Test measurements:   Tenderness and fullness R QL  Hypomobility L hip joints, decreased L hip IR and ER     Exercises/Interventions:     Therapeutic Ex (83523)  resistance

## 2024-06-24 ENCOUNTER — APPOINTMENT (OUTPATIENT)
Dept: PHYSICAL THERAPY | Age: 49
End: 2024-06-24
Payer: COMMERCIAL

## 2024-07-01 ENCOUNTER — HOSPITAL ENCOUNTER (OUTPATIENT)
Dept: PHYSICAL THERAPY | Age: 49
Setting detail: THERAPIES SERIES
Discharge: HOME | End: 2024-07-01
Payer: COMMERCIAL

## 2024-07-01 PROCEDURE — 97112 NEUROMUSCULAR REEDUCATION: CPT

## 2024-07-01 PROCEDURE — 97140 MANUAL THERAPY 1/> REGIONS: CPT

## 2024-07-01 PROCEDURE — 97110 THERAPEUTIC EXERCISES: CPT

## 2024-07-01 NOTE — FLOWSHEET NOTE
[facility]- Outpatient Rehabilitation and Therapy  [address], [place] [address] office: [phone] fax: [phone]      Physical Therapy: TREATMENT/PROGRESS NOTE   Patient: Irma Cavanaugh (48 y.o. female)   Treatment Date: 2024   :  1975 MRN: [id number]   Visit #: 19   Insurance Allowable Auth Needed   12 []Yes    [x]No    Insurance: Payor: [employer] / Plan: [facility] SHARED SERVICES / Product Type: *No Product type* /   Insurance ID: [id number] - (Commercial)  Secondary Insurance (if applicable):    Treatment Diagnosis: R K' pain   Medical Diagnosis:    Pain in right knee [M25.561]  Other chronic pain [G89.29]   Referring Physician: Julianne Guerin, *  PCP: Cheyenne Garcia MD                             Plan of care signed: NO    Date of Patient follow up with Physician:      Progress Report/POC: EVAL today  POC update due: (10 visits /OR AUTH LIMITS, whichever is less) 10 2024     Precautions/ Contra-indications:                                                                                          Latex allergy:  NO  Pacemaker:    NO  Contraindications for Manipulation: None  Date of Surgery: N/A  Other:      Red Flags:  None    Preferred Language for Healthcare:   [x]English       []other:    SUBJECTIVE EXAMINATION     Patient Report/Comments:  Pt reports stiffness in the thighs this morning.  Pt also notes soreness in B knees for a couple of days after walking in the woods in [place].       Test used Initial score  3/4/24 4/24/24 5/20/24 6/17/24   Pain Summary VAS 3-7/10 2-3/10 0-110 1-2/10   Functional questionnaire LEFS 29% 31% disability  7.5% disability    Other:                    OBJECTIVE EXAMINATION     Observation:     Test measurements:   Tenderness and fullness R QL  Hypomobility L hip joints, decreased L hip IR and ER     Exercises/Interventions:     Therapeutic Ex (75764)  resistance Sets/time Reps

## 2024-07-08 ENCOUNTER — HOSPITAL ENCOUNTER (OUTPATIENT)
Dept: PHYSICAL THERAPY | Age: 49
Setting detail: THERAPIES SERIES
Discharge: HOME | End: 2024-07-08
Payer: COMMERCIAL

## 2024-07-08 PROCEDURE — 97112 NEUROMUSCULAR REEDUCATION: CPT

## 2024-07-08 PROCEDURE — 97110 THERAPEUTIC EXERCISES: CPT

## 2024-07-08 NOTE — FLOWSHEET NOTE
Patient will be able to walk for at least 20 minute without increased pain or fatigue to work towards return to prior level of function.  Status: [] Progressing: [x] Met: [] Not Met: [] Adjusted  5. Patient will return to a personal exercise program without increased symptoms and with carryover of therapeutic interventions to promote long-term improvement.                                                                                                                   Status: [x] Progressing: [] Met: [] Not Met: [] Adjusted       Overall Progression Towards Functional goals/ Treatment Progress Update:  [] Patient is progressing as expected towards functional goals listed.    [x] Progression is slowed due to complexities/Impairments listed.  [] Progression has been slowed due to co-morbidities.  [] Plan just implemented, too soon (<30days) to assess goals progression   [] Goals require adjustment due to lack of progress  [] Patient is not progressing as expected and requires additional follow up with physician  [] Other:     TREATMENT PLAN   Plan: Frequency/Duration: 2x/week for  8-12  weeks for the following treatment interventions:     Interventions:  [x] Therapeutic exercise including: strength training, ROM, including postural re-education.   [x] NMR activation and proprioception, including postural re-education.    [x] Manual therapy as indicated to include: PROM, Gr I-IV mobilizations, STM, Grade V Manipulation, and Dry Needling/IASTM  [x] Modalities as needed that may include: Cryotherapy, Electrical Stimulation, Biofeedback, Thermal Agents, Vasoneumatic Compression, Traction, Ultrasound, Iontophoresis, Paraffin, and Whirlpool  [x] Patient education on joint protection, postural re-education, activity modification, progression of HEP.        [] Aquatic Therapy     Electronically Signed by Dre Acosta PT              Date: 07/08/2024

## 2024-07-25 ENCOUNTER — HOSPITAL ENCOUNTER (OUTPATIENT)
Dept: PHYSICAL THERAPY | Age: 49
Setting detail: THERAPIES SERIES
Discharge: HOME | End: 2024-07-25
Payer: COMMERCIAL

## 2024-07-25 PROCEDURE — 97112 NEUROMUSCULAR REEDUCATION: CPT

## 2024-07-25 PROCEDURE — 97110 THERAPEUTIC EXERCISES: CPT

## 2024-07-25 NOTE — FLOWSHEET NOTE
services:  The patient has functional impairments and/or activity limitations and would benefit from continued outpatient therapy services to address the deficits outlined in the patients goals  The patient has a musculoskeletal condition(s) with a corresponding ICD-10 code that is of complexity and severity that require skilled therapeutic intervention. This has a direct and significant impact on the need for therapy and significantly impacts the rate of recovery.     Treatment/Activity Tolerance:  [x] Patient tolerated treatment well [] Patient limited by fatique  [] Patient limited by pain  [] Patient limited by other medical complications  [] Other:     Return to Play: NA    Prognosis for POC: [x] Good [] Fair  [] Poor    Patient requires continued skilled intervention: [x] Yes  [] No    GOALS     Patient stated goal: Return to walking without compensation and return to personal exercise.   Status: [] Progressing: [x] Met: [] Not Met: [] Adjusted     Therapist goals for Patient:   Short Term Goals: To be achieved in: 2 weeks  Independent in HEP and progression per patient tolerance, in order to progress toward full function and prevent re-injury.               Status: [] Progressing: [x] Met: [] Not Met: [] Adjusted  Patient will have a decrease in pain to 1/10 to help facilitate improvement in movement, function, and ADLs as indicated by functional deficits.              Status: [] Progressing: [x] Met: [] Not Met: [] Adjusted     Long Term Goals: To be achieved in:  8-12  weeks  Disability index score of 15% or less for the LEFS to assist with return top prior level of function.                  Status: [] Progressing: [x] Met: [] Not Met: [] Adjusted  Improve hip AROM ER and IR to WFL to allow for proper joint functioning as indicated by patients functional deficits.  Status: [] Progressing: [x] Met: [] Not Met: [] Adjusted  3. Pt to improve strength to 4-/5 or better of hip abductors and external rotators to

## 2024-07-25 NOTE — PROGRESS NOTES
Therapeutic exercise including: strength training, ROM, including postural re-education.   [x] NMR activation and proprioception, including postural re-education.    [x] Manual therapy as indicated to include: PROM, Gr I-IV mobilizations, STM, Grade V Manipulation, and Dry Needling/IASTM  [x] Modalities as needed that may include: Cryotherapy, Electrical Stimulation, Biofeedback, Thermal Agents, Vasoneumatic Compression, Traction, Ultrasound, Iontophoresis, Paraffin, and Whirlpool  [x] Patient education on joint protection, postural re-education, activity modification, progression of HEP.        [] Aquatic Therapy     Electronically Signed by Dre Acosta, PT              Date: 07/25/2024

## 2024-09-18 ENCOUNTER — APPOINTMENT (OUTPATIENT)
Dept: GENERAL RADIOLOGY | Age: 49
End: 2024-09-18
Payer: COMMERCIAL

## 2024-09-18 ENCOUNTER — HOSPITAL ENCOUNTER (EMERGENCY)
Age: 49
Discharge: HOME | End: 2024-09-18
Attending: EMERGENCY MEDICINE
Payer: COMMERCIAL

## 2024-09-18 VITALS
DIASTOLIC BLOOD PRESSURE: 95 MMHG | SYSTOLIC BLOOD PRESSURE: 151 MMHG | BODY MASS INDEX: 36.99 KG/M2 | RESPIRATION RATE: 18 BRPM | OXYGEN SATURATION: 97 % | HEART RATE: 97 BPM | WEIGHT: 229.2 LBS | TEMPERATURE: 99.1 F

## 2024-09-18 DIAGNOSIS — R13.10 DYSPHAGIA, UNSPECIFIED TYPE: Primary | ICD-10-CM

## 2024-09-18 LAB — S PYO AG THROAT QL: NEGATIVE

## 2024-09-18 PROCEDURE — 99284 EMERGENCY DEPT VISIT MOD MDM: CPT

## 2024-09-18 PROCEDURE — 70360 X-RAY EXAM OF NECK: CPT

## 2024-09-18 PROCEDURE — 87880 STREP A ASSAY W/OPTIC: CPT

## 2024-09-18 RX ORDER — AMLODIPINE BESYLATE 5 MG/1
5 TABLET ORAL DAILY
COMMUNITY

## 2024-09-18 RX ORDER — METOPROLOL SUCCINATE 25 MG/1
1 TABLET, EXTENDED RELEASE ORAL DAILY
COMMUNITY
Start: 2024-03-29

## 2024-09-18 ASSESSMENT — PAIN SCALES - GENERAL: PAINLEVEL_OUTOF10: 0

## 2024-09-18 ASSESSMENT — PAIN - FUNCTIONAL ASSESSMENT: PAIN_FUNCTIONAL_ASSESSMENT: 0-10

## 2025-06-14 ENCOUNTER — APPOINTMENT (OUTPATIENT)
Dept: CT IMAGING | Age: 50
DRG: 149 | End: 2025-06-14
Payer: COMMERCIAL

## 2025-06-14 ENCOUNTER — APPOINTMENT (OUTPATIENT)
Dept: GENERAL RADIOLOGY | Age: 50
DRG: 149 | End: 2025-06-14
Payer: COMMERCIAL

## 2025-06-14 ENCOUNTER — HOSPITAL ENCOUNTER (INPATIENT)
Age: 50
LOS: 2 days | Discharge: HOME | DRG: 149 | End: 2025-06-16
Attending: EMERGENCY MEDICINE | Admitting: STUDENT IN AN ORGANIZED HEALTH CARE EDUCATION/TRAINING PROGRAM
Payer: COMMERCIAL

## 2025-06-14 DIAGNOSIS — H81.10 BENIGN PAROXYSMAL POSITIONAL VERTIGO, UNSPECIFIED LATERALITY: ICD-10-CM

## 2025-06-14 DIAGNOSIS — R42 DIZZINESS: Primary | ICD-10-CM

## 2025-06-14 DIAGNOSIS — R11.2 NAUSEA AND VOMITING, UNSPECIFIED VOMITING TYPE: ICD-10-CM

## 2025-06-14 LAB
ALBUMIN SERPL-MCNC: 4.9 G/DL (ref 3.4–5)
ALBUMIN/GLOB SERPL: 2 {RATIO} (ref 1.1–2.2)
ALP SERPL-CCNC: 97 U/L (ref 40–129)
ALT SERPL-CCNC: 15 U/L (ref 10–40)
ANION GAP SERPL CALCULATED.3IONS-SCNC: 12 MMOL/L (ref 3–16)
AST SERPL-CCNC: 15 U/L (ref 15–37)
BASOPHILS # BLD: 0 K/UL (ref 0–0.2)
BASOPHILS NFR BLD: 0.5 %
BILIRUB SERPL-MCNC: 0.8 MG/DL (ref 0–1)
BUN SERPL-MCNC: 10 MG/DL (ref 7–20)
CALCIUM SERPL-MCNC: 9.4 MG/DL (ref 8.3–10.6)
CHLORIDE SERPL-SCNC: 104 MMOL/L (ref 99–110)
CO2 SERPL-SCNC: 24 MMOL/L (ref 21–32)
CREAT SERPL-MCNC: 0.7 MG/DL (ref 0.6–1.1)
DEPRECATED RDW RBC AUTO: 13.1 % (ref 12.4–15.4)
EOSINOPHIL # BLD: 0.1 K/UL (ref 0–0.6)
EOSINOPHIL NFR BLD: 0.9 %
GFR SERPLBLD CREATININE-BSD FMLA CKD-EPI: >90 ML/MIN/{1.73_M2}
GLUCOSE BLD-MCNC: 115 MG/DL (ref 70–99)
GLUCOSE SERPL-MCNC: 118 MG/DL (ref 70–99)
HCG SERPL QL: NEGATIVE
HCT VFR BLD AUTO: 42 % (ref 36–48)
HGB BLD-MCNC: 14.4 G/DL (ref 12–16)
INR PPP: 0.97 (ref 0.86–1.14)
LYMPHOCYTES # BLD: 1.2 K/UL (ref 1–5.1)
LYMPHOCYTES NFR BLD: 14.7 %
MAGNESIUM SERPL-MCNC: 2.11 MG/DL (ref 1.8–2.4)
MCH RBC QN AUTO: 28 PG (ref 26–34)
MCHC RBC AUTO-ENTMCNC: 34.3 G/DL (ref 31–36)
MCV RBC AUTO: 81.6 FL (ref 80–100)
MONOCYTES # BLD: 0.4 K/UL (ref 0–1.3)
MONOCYTES NFR BLD: 5.1 %
NEUTROPHILS # BLD: 6.6 K/UL (ref 1.7–7.7)
NEUTROPHILS NFR BLD: 78.8 %
PERFORMED ON: ABNORMAL
PLATELET # BLD AUTO: 232 K/UL (ref 135–450)
PMV BLD AUTO: 8.8 FL (ref 5–10.5)
POTASSIUM SERPL-SCNC: 3.4 MMOL/L (ref 3.5–5.1)
PROT SERPL-MCNC: 7.3 G/DL (ref 6.4–8.2)
PROTHROMBIN TIME: 13.2 SEC (ref 12.1–14.9)
RBC # BLD AUTO: 5.15 M/UL (ref 4–5.2)
SODIUM SERPL-SCNC: 140 MMOL/L (ref 136–145)
TROPONIN, HIGH SENSITIVITY: 12 NG/L (ref 0–14)
WBC # BLD AUTO: 8.4 K/UL (ref 4–11)

## 2025-06-14 PROCEDURE — 2500000003 HC RX 250 WO HCPCS: Performed by: STUDENT IN AN ORGANIZED HEALTH CARE EDUCATION/TRAINING PROGRAM

## 2025-06-14 PROCEDURE — 36415 COLL VENOUS BLD VENIPUNCTURE: CPT

## 2025-06-14 PROCEDURE — 6360000004 HC RX CONTRAST MEDICATION: Performed by: PHYSICIAN ASSISTANT

## 2025-06-14 PROCEDURE — 70450 CT HEAD/BRAIN W/O DYE: CPT

## 2025-06-14 PROCEDURE — 93005 ELECTROCARDIOGRAM TRACING: CPT | Performed by: PHYSICIAN ASSISTANT

## 2025-06-14 PROCEDURE — 96361 HYDRATE IV INFUSION ADD-ON: CPT

## 2025-06-14 PROCEDURE — 84484 ASSAY OF TROPONIN QUANT: CPT

## 2025-06-14 PROCEDURE — 71045 X-RAY EXAM CHEST 1 VIEW: CPT

## 2025-06-14 PROCEDURE — 2580000003 HC RX 258: Performed by: PHYSICIAN ASSISTANT

## 2025-06-14 PROCEDURE — G0378 HOSPITAL OBSERVATION PER HR: HCPCS

## 2025-06-14 PROCEDURE — 96374 THER/PROPH/DIAG INJ IV PUSH: CPT

## 2025-06-14 PROCEDURE — 1200000000 HC SEMI PRIVATE

## 2025-06-14 PROCEDURE — 6370000000 HC RX 637 (ALT 250 FOR IP): Performed by: PHYSICIAN ASSISTANT

## 2025-06-14 PROCEDURE — 96376 TX/PRO/DX INJ SAME DRUG ADON: CPT

## 2025-06-14 PROCEDURE — 70498 CT ANGIOGRAPHY NECK: CPT

## 2025-06-14 PROCEDURE — 83735 ASSAY OF MAGNESIUM: CPT

## 2025-06-14 PROCEDURE — 80053 COMPREHEN METABOLIC PANEL: CPT

## 2025-06-14 PROCEDURE — 99285 EMERGENCY DEPT VISIT HI MDM: CPT

## 2025-06-14 PROCEDURE — 6370000000 HC RX 637 (ALT 250 FOR IP): Performed by: STUDENT IN AN ORGANIZED HEALTH CARE EDUCATION/TRAINING PROGRAM

## 2025-06-14 PROCEDURE — 84703 CHORIONIC GONADOTROPIN ASSAY: CPT

## 2025-06-14 PROCEDURE — 85610 PROTHROMBIN TIME: CPT

## 2025-06-14 PROCEDURE — 6360000002 HC RX W HCPCS: Performed by: PHYSICIAN ASSISTANT

## 2025-06-14 PROCEDURE — 85025 COMPLETE CBC W/AUTO DIFF WBC: CPT

## 2025-06-14 RX ORDER — METOPROLOL SUCCINATE 25 MG/1
25 TABLET, EXTENDED RELEASE ORAL DAILY
Status: DISCONTINUED | OUTPATIENT
Start: 2025-06-15 | End: 2025-06-16 | Stop reason: HOSPADM

## 2025-06-14 RX ORDER — POLYETHYLENE GLYCOL 3350 17 G/17G
17 POWDER, FOR SOLUTION ORAL DAILY PRN
Status: DISCONTINUED | OUTPATIENT
Start: 2025-06-14 | End: 2025-06-16 | Stop reason: HOSPADM

## 2025-06-14 RX ORDER — 0.9 % SODIUM CHLORIDE 0.9 %
1000 INTRAVENOUS SOLUTION INTRAVENOUS ONCE
Status: COMPLETED | OUTPATIENT
Start: 2025-06-14 | End: 2025-06-14

## 2025-06-14 RX ORDER — MECLIZINE HCL 12.5 MG 12.5 MG/1
12.5 TABLET ORAL 3 TIMES DAILY
Status: DISCONTINUED | OUTPATIENT
Start: 2025-06-15 | End: 2025-06-15

## 2025-06-14 RX ORDER — LOSARTAN POTASSIUM 25 MG/1
50 TABLET ORAL DAILY
Status: DISCONTINUED | OUTPATIENT
Start: 2025-06-15 | End: 2025-06-16 | Stop reason: HOSPADM

## 2025-06-14 RX ORDER — ACETAMINOPHEN 325 MG/1
650 TABLET ORAL EVERY 4 HOURS PRN
Status: DISCONTINUED | OUTPATIENT
Start: 2025-06-14 | End: 2025-06-16 | Stop reason: HOSPADM

## 2025-06-14 RX ORDER — SODIUM CHLORIDE 0.9 % (FLUSH) 0.9 %
5-40 SYRINGE (ML) INJECTION EVERY 12 HOURS SCHEDULED
Status: DISCONTINUED | OUTPATIENT
Start: 2025-06-14 | End: 2025-06-16 | Stop reason: HOSPADM

## 2025-06-14 RX ORDER — ATORVASTATIN CALCIUM 80 MG/1
80 TABLET, FILM COATED ORAL NIGHTLY
Status: DISCONTINUED | OUTPATIENT
Start: 2025-06-14 | End: 2025-06-16

## 2025-06-14 RX ORDER — IOPAMIDOL 755 MG/ML
75 INJECTION, SOLUTION INTRAVASCULAR
Status: COMPLETED | OUTPATIENT
Start: 2025-06-14 | End: 2025-06-14

## 2025-06-14 RX ORDER — ONDANSETRON 2 MG/ML
4 INJECTION INTRAMUSCULAR; INTRAVENOUS ONCE
Status: COMPLETED | OUTPATIENT
Start: 2025-06-14 | End: 2025-06-14

## 2025-06-14 RX ORDER — ONDANSETRON 4 MG/1
4 TABLET, ORALLY DISINTEGRATING ORAL EVERY 8 HOURS PRN
Status: DISCONTINUED | OUTPATIENT
Start: 2025-06-14 | End: 2025-06-16 | Stop reason: HOSPADM

## 2025-06-14 RX ORDER — SODIUM CHLORIDE 9 MG/ML
INJECTION, SOLUTION INTRAVENOUS PRN
Status: DISCONTINUED | OUTPATIENT
Start: 2025-06-14 | End: 2025-06-16 | Stop reason: HOSPADM

## 2025-06-14 RX ORDER — ASPIRIN 300 MG/1
300 SUPPOSITORY RECTAL DAILY
Status: DISCONTINUED | OUTPATIENT
Start: 2025-06-15 | End: 2025-06-16

## 2025-06-14 RX ORDER — MECLIZINE HCL 12.5 MG 12.5 MG/1
25 TABLET ORAL ONCE
Status: COMPLETED | OUTPATIENT
Start: 2025-06-14 | End: 2025-06-14

## 2025-06-14 RX ORDER — SODIUM CHLORIDE 0.9 % (FLUSH) 0.9 %
5-40 SYRINGE (ML) INJECTION PRN
Status: DISCONTINUED | OUTPATIENT
Start: 2025-06-14 | End: 2025-06-16 | Stop reason: HOSPADM

## 2025-06-14 RX ORDER — ONDANSETRON 2 MG/ML
4 INJECTION INTRAMUSCULAR; INTRAVENOUS EVERY 6 HOURS PRN
Status: DISCONTINUED | OUTPATIENT
Start: 2025-06-14 | End: 2025-06-16 | Stop reason: HOSPADM

## 2025-06-14 RX ORDER — ACETAMINOPHEN 650 MG/1
650 SUPPOSITORY RECTAL EVERY 4 HOURS PRN
Status: DISCONTINUED | OUTPATIENT
Start: 2025-06-14 | End: 2025-06-16 | Stop reason: HOSPADM

## 2025-06-14 RX ORDER — ASPIRIN 81 MG/1
81 TABLET, CHEWABLE ORAL DAILY
Status: DISCONTINUED | OUTPATIENT
Start: 2025-06-15 | End: 2025-06-16

## 2025-06-14 RX ORDER — AMLODIPINE BESYLATE 5 MG/1
5 TABLET ORAL DAILY
Status: DISCONTINUED | OUTPATIENT
Start: 2025-06-15 | End: 2025-06-16 | Stop reason: HOSPADM

## 2025-06-14 RX ORDER — ENOXAPARIN SODIUM 100 MG/ML
30 INJECTION SUBCUTANEOUS 2 TIMES DAILY
Status: DISCONTINUED | OUTPATIENT
Start: 2025-06-14 | End: 2025-06-16 | Stop reason: HOSPADM

## 2025-06-14 RX ADMIN — Medication 10 ML: at 23:29

## 2025-06-14 RX ADMIN — ONDANSETRON 4 MG: 2 INJECTION INTRAMUSCULAR; INTRAVENOUS at 17:55

## 2025-06-14 RX ADMIN — ONDANSETRON 4 MG: 2 INJECTION, SOLUTION INTRAMUSCULAR; INTRAVENOUS at 18:55

## 2025-06-14 RX ADMIN — ATORVASTATIN CALCIUM 80 MG: 80 TABLET, FILM COATED ORAL at 23:27

## 2025-06-14 RX ADMIN — IOPAMIDOL 75 ML: 755 INJECTION, SOLUTION INTRAVENOUS at 17:53

## 2025-06-14 RX ADMIN — SODIUM CHLORIDE 1000 ML: 9 INJECTION, SOLUTION INTRAVENOUS at 17:59

## 2025-06-14 RX ADMIN — MECLIZINE 25 MG: 12.5 TABLET ORAL at 17:55

## 2025-06-14 ASSESSMENT — LIFESTYLE VARIABLES
HOW MANY STANDARD DRINKS CONTAINING ALCOHOL DO YOU HAVE ON A TYPICAL DAY: PATIENT DOES NOT DRINK
HOW OFTEN DO YOU HAVE A DRINK CONTAINING ALCOHOL: NEVER

## 2025-06-14 ASSESSMENT — PAIN SCALES - GENERAL: PAINLEVEL_OUTOF10: 5

## 2025-06-14 NOTE — ED PROVIDER NOTES
[facility] EMERGENCY DEPARTMENT  EMERGENCY DEPARTMENT ENCOUNTER        Pt Name: Irma Cavanaugh  MRN: [id number]  Birthdate 1975  Date of evaluation: 6/14/2025  Provider: YAO LUO PA-C  PCP: Cheyenne Garcia MD  ED Attending: Don Rosario MD       I have seen and evaluated this patient with my supervising physician Don Rosario MD.    CHIEF COMPLAINT:     Chief Complaint   Patient presents with    Dizziness     Pt reports she woke up around 0830 with dizziness. Pt states it has continued throughout the day. Pt states this has never happened before. Pt also states she has been vomiting due to the dizziness. Glucose 115       HISTORY OF PRESENT ILLNESS:      History provided by the patient. No limitations.    Irma Cavanaugh is a 49 y.o. female who arrives to the ED by private vehicle.  Patient was dropped off.  She is here complaining of dizziness.  She woke up at 8:30 AM and felt dizzy.  She thought maybe she had just gotten up too quickly.  However, she has continued with it throughout the day.  She describes feeling a little off balance, especially when she first gets up.  This afternoon she has had multiple bouts of nausea and vomiting.  She reports some mild anterior headache.  She denies any visual changes, unilateral weakness or numbness.  She denies chest pain, palpitations, shortness of breath or abdominal pain.  She has never felt like this in the past.  She states she feels like \"something is not right\".  No significant identifiable exacerbating or alleviating factors to symptoms.    Nursing Notes were reviewed     REVIEW OF SYSTEMS:     Review of Systems  Positives and pertinent negatives as per HPI.      PAST MEDICAL HISTORY:     Past Medical History:   Diagnosis Date    Hypertension        SURGICAL HISTORY:      Past Surgical History:   Procedure Laterality Date    WISDOM TOOTH EXTRACTION         CURRENT MEDICATIONS:       Previous Medications    AMLODIPINE

## 2025-06-14 NOTE — ED NOTES
Code Stroke  @1737 Cristian Called Code Stroke  @1737 Called CT   @1737 Called  Stroke Team  @1737 called lab   @1744  called back and spoke to Cristian BETHEA

## 2025-06-15 ENCOUNTER — APPOINTMENT (OUTPATIENT)
Dept: MRI IMAGING | Age: 50
DRG: 149 | End: 2025-06-15
Payer: COMMERCIAL

## 2025-06-15 LAB
EKG ATRIAL RATE: 78 BPM
EKG ATRIAL RATE: 83 BPM
EKG DIAGNOSIS: NORMAL
EKG DIAGNOSIS: NORMAL
EKG P AXIS: 39 DEGREES
EKG P AXIS: 44 DEGREES
EKG P-R INTERVAL: 176 MS
EKG P-R INTERVAL: 178 MS
EKG Q-T INTERVAL: 405 MS
EKG Q-T INTERVAL: 420 MS
EKG QRS DURATION: 92 MS
EKG QRS DURATION: 96 MS
EKG QTC CALCULATION (BAZETT): 476 MS
EKG QTC CALCULATION (BAZETT): 479 MS
EKG R AXIS: -38 DEGREES
EKG R AXIS: -46 DEGREES
EKG T AXIS: 18 DEGREES
EKG T AXIS: 39 DEGREES
EKG VENTRICULAR RATE: 78 BPM
EKG VENTRICULAR RATE: 83 BPM

## 2025-06-15 PROCEDURE — G0378 HOSPITAL OBSERVATION PER HR: HCPCS

## 2025-06-15 PROCEDURE — 6370000000 HC RX 637 (ALT 250 FOR IP): Performed by: STUDENT IN AN ORGANIZED HEALTH CARE EDUCATION/TRAINING PROGRAM

## 2025-06-15 PROCEDURE — 1200000000 HC SEMI PRIVATE

## 2025-06-15 PROCEDURE — 93005 ELECTROCARDIOGRAM TRACING: CPT | Performed by: STUDENT IN AN ORGANIZED HEALTH CARE EDUCATION/TRAINING PROGRAM

## 2025-06-15 PROCEDURE — 96375 TX/PRO/DX INJ NEW DRUG ADDON: CPT

## 2025-06-15 PROCEDURE — 6360000002 HC RX W HCPCS: Performed by: STUDENT IN AN ORGANIZED HEALTH CARE EDUCATION/TRAINING PROGRAM

## 2025-06-15 PROCEDURE — 97165 OT EVAL LOW COMPLEX 30 MIN: CPT

## 2025-06-15 PROCEDURE — 70551 MRI BRAIN STEM W/O DYE: CPT

## 2025-06-15 PROCEDURE — 97535 SELF CARE MNGMENT TRAINING: CPT

## 2025-06-15 PROCEDURE — 97161 PT EVAL LOW COMPLEX 20 MIN: CPT

## 2025-06-15 PROCEDURE — 92610 EVALUATE SWALLOWING FUNCTION: CPT

## 2025-06-15 PROCEDURE — 97530 THERAPEUTIC ACTIVITIES: CPT

## 2025-06-15 PROCEDURE — 2500000003 HC RX 250 WO HCPCS: Performed by: STUDENT IN AN ORGANIZED HEALTH CARE EDUCATION/TRAINING PROGRAM

## 2025-06-15 RX ORDER — MECLIZINE HCL 12.5 MG 12.5 MG/1
12.5 TABLET ORAL 3 TIMES DAILY PRN
Status: DISCONTINUED | OUTPATIENT
Start: 2025-06-15 | End: 2025-06-16 | Stop reason: HOSPADM

## 2025-06-15 RX ORDER — LORAZEPAM 2 MG/ML
1 INJECTION INTRAMUSCULAR ONCE
Status: COMPLETED | OUTPATIENT
Start: 2025-06-15 | End: 2025-06-15

## 2025-06-15 RX ADMIN — LORAZEPAM 1 MG: 2 INJECTION INTRAMUSCULAR; INTRAVENOUS at 16:17

## 2025-06-15 RX ADMIN — METOPROLOL SUCCINATE 25 MG: 25 TABLET, EXTENDED RELEASE ORAL at 09:33

## 2025-06-15 RX ADMIN — MECLIZINE 12.5 MG: 12.5 TABLET ORAL at 09:33

## 2025-06-15 RX ADMIN — Medication 10 ML: at 20:19

## 2025-06-15 RX ADMIN — ASPIRIN 81 MG: 81 TABLET, CHEWABLE ORAL at 09:33

## 2025-06-15 RX ADMIN — LOSARTAN POTASSIUM 50 MG: 25 TABLET, FILM COATED ORAL at 09:33

## 2025-06-15 RX ADMIN — ATORVASTATIN CALCIUM 80 MG: 80 TABLET, FILM COATED ORAL at 20:18

## 2025-06-15 RX ADMIN — AMLODIPINE BESYLATE 5 MG: 5 TABLET ORAL at 09:33

## 2025-06-15 NOTE — PLAN OF CARE
Problem: Discharge Planning  Goal: Discharge to home or other facility with appropriate resources  Outcome: Progressing  Flowsheets (Taken 6/15/2025 0938)  Discharge to home or other facility with appropriate resources: Identify barriers to discharge with patient and caregiver     Problem: Pain  Goal: Verbalizes/displays adequate comfort level or baseline comfort level  Outcome: Progressing     Problem: ABCDS Injury Assessment  Goal: Absence of physical injury  Outcome: Progressing     Problem: Gastrointestinal - Adult  Goal: Minimal or absence of nausea and vomiting  Outcome: Progressing  Flowsheets (Taken 6/15/2025 0938)  Minimal or absence of nausea and vomiting: Administer ordered antiemetic medications as needed

## 2025-06-15 NOTE — PROGRESS NOTES
Patient admitted to room 351 from ED.  Patient oriented to room, call light, bed rails, phone, lights and bathroom.  Patient instructed about the schedule of the day including: vital sign frequency, lab draws, possible tests, frequency of MD and staff rounds, including RN/MD rounding together at bedside, daily weights, and I &O's.  Patient instructed about prescribed diet, how to use 8MENU, and television.  No bed alarm in place,(independent/selfer). With Telemetry box in place, patient aware of placement and reason.  Bed locked, in lowest position, side rails up 2/4, call light within reach.  Will continue to monitor.

## 2025-06-15 NOTE — ED PROVIDER NOTES
[facility] EMERGENCY DEPARTMENT     EMERGENCY DEPARTMENT ENCOUNTER     Location: [facility] EMERGENCY DEPARTMENT  6/14/2025  Note Started: 10:51 PM EDT 6/14/25      Patient Identification  Irma Cavanaugh is a 49 y.o. female  Chief Complaint   Patient presents with    Dizziness     Pt reports she woke up around 0830 with dizziness. Pt states it has continued throughout the day. Pt states this has never happened before. Pt also states she has been vomiting due to the dizziness. Glucose 115       HPI:Irma Cavanaugh was evaluated in the Emergency Department for dizziness.  Patient states she woke up around 830 with dizziness that seem to get worse when she sat up.  She felt like the room was spinning.  She has a chronic history of tinnitus.  She states that seem to get better and then came out again after a nap today which prompted presentation.  She has had some vomiting with the dizziness.  Denies any trouble with her vision or speech.  No numbness or weakness of extremities.. Although initial history and physical exam information was obtained by MATILDA/NPP (who also dictated a record of this visit), I personally saw the patient and performed and made/approved the management plan and take responsibility for the patient management.  MATILDA report to me: Stroke alert called by PA prior to my evaluation    PHYSICAL EXAM:  Patient awake and alert and in mild distress due to nausea and dizziness.  Negative [place]-Hallpike maneuver bilaterally.  Hints exam reassuring.    XR CHEST PORTABLE   Final Result      1. No acute disease.      Electronically signed by Vinod Turcios MD      CTA HEAD NECK W CONTRAST   Final Result      1. No significant stenosis of the arteries of the neck.            PROCEDURE: CT ANGIOGRAPHY HEAD WITH/WITHOUT CONTRAST      TECHNIQUE: Axial CT imaging obtained through the head prior to and following administration of IV contrast. Axial images, multiplanar reformatted images, and maximum

## 2025-06-15 NOTE — PLAN OF CARE
Problem: Discharge Planning  Goal: Discharge to home or other facility with appropriate resources  Outcome: Progressing     Problem: Pain  Goal: Verbalizes/displays adequate comfort level or baseline comfort level  Outcome: Progressing     Problem: ABCDS Injury Assessment  Goal: Absence of physical injury  Outcome: Progressing     Problem: Gastrointestinal - Adult  Goal: Minimal or absence of nausea and vomiting  Outcome: Progressing

## 2025-06-15 NOTE — PROGRESS NOTES
[facility] Medicine Progress Note  V 5.17      Date of Admission: 6/14/2025    Hospital Day: 2      Chief Admission Complaint:    Dizziness (Pt reports she woke up around 0830 with dizziness. Pt states it has continued throughout the day. Pt states this has never happened before. Pt also states she has been vomiting due to the dizziness. Glucose 115)    Subjective:     Patient is up sitting in the chair when I arrive. She states she has no further dizziness but feels a bit off this morning. She denies HA, visual changes, weakness or N/T.     Performed Marjorie -Halpike with patient and she had some dizziness with horizontal nystagmus noted. Dizziness resolved without intervention. She has been taking scheduled Meclizine since admission.     Presenting Admission History:       49 y.o. female who presented to [facility] with Dizziness, nausea and vomiting.  PMHx significant for hypertension.  She reports acute onset dizziness this morning ~0830 when she sat up in bed. She states she thought it was due to her sitting up too quickly, but she had three other similar episodes throughout the day. She reports the \"room spinning\" with movement and sitting up with intermittent episodes of N/V. She does complain of mild anterior headache. She denies any vision changes, facial or extremity numbness, weakness, chest pain, shortness of breath, or abdominal pain. She denies any recent illnesses. She states she has never experienced anything like thi before. NIH score 0 on examination.      A stroke alert was called in ED and completed. Chest x-ray is non-acute. CT head normal.  CTA head and neck unremarkable. EKG showed Troponin 12, CBC normal, CMP K+3.4. Glucose 118. Agree with admission for Neurology evaluation and MRI brain to r/lo stroke.      Assessment/Plan:       Dizziness possibly BPPV, ruling out TIA vs CVA  Nausea/vomiting, resolved  - CT head normal.  - CTA head and neck unremarkable  - MRI brain

## 2025-06-15 NOTE — PROGRESS NOTES
Speech Language Pathology  Clinical Bedside Swallow Assessment  Facility/Department: [facility] B3 - MED SURG    No further ST warranted at this time. Please re-consult if concerns arise.     Recommendations:  Diet recommendation: IDDSI 7 Regular Solids; IDDSI 0 Thin Liquids; Meds whole with thin liquids  Instrumentation: Not clinically indicated at this time  Risk management: upright for all intake, stay upright for at least 30 mins after intake, general GERD precautions, general aspiration precautions, and hold PO and contact SLP if s/s of aspiration or worsening respiratory status develop.      NAME:Irma Cavanaugh  : 1975 (49 y.o.)   MRN: [id number]  ROOM: [address] [place] [address]  ADMISSION DATE: 2025  PATIENT DIAGNOSIS(ES): Dizziness [R42]  Nausea and vomiting, unspecified vomiting type [R11.2]  Chief Complaint   Patient presents with    Dizziness     Pt reports she woke up around 0830 with dizziness. Pt states it has continued throughout the day. Pt states this has never happened before. Pt also states she has been vomiting due to the dizziness. Glucose 115     Patient Active Problem List    Diagnosis Date Noted    Dizziness 2025     Past Medical History:   Diagnosis Date    Hypertension      Past Surgical History:   Procedure Laterality Date    WISDOM TOOTH EXTRACTION       Allergies   Allergen Reactions    Sulfa Antibiotics Nausea Only       DATE ONSET: 2025    Date of Evaluation: 6/15/2025   Evaluating Therapist: SIENNA Richard    Chart Reviewed: : [x] Yes [] No     Pain: The patient does not complain of pain     Current Diet: ADULT DIET; Regular      Most Recent Imaging    XR CHEST PORTABLE   Final Result      1. No acute disease.      Electronically signed by Vinod Turcios MD      CTA HEAD NECK W CONTRAST   Final Result      1. No significant stenosis of the arteries of the neck.            PROCEDURE: CT ANGIOGRAPHY HEAD WITH/WITHOUT CONTRAST      TECHNIQUE: Axial CT imaging obtained

## 2025-06-15 NOTE — CONSULTS
Who: Zeeshan  Date:6/15/25  Time: 1001     Electronically signed by Kalie Rodriguez RN on 6/15/2025 at 10:01 AM

## 2025-06-15 NOTE — H&P
[facility] Medicine History & Physical    V 5.1    Date of Admission: 6/14/2025    Date of Service:  Pt seen/examined on 6/14     [x]Admitted to Inpatient with expected LOS greater than two midnights due to medical therapy.  []Placed in Observation status.    Chief Admission Complaint:  dizziness, room spinning, N/V     Presenting Admission History:      49 y.o. female who presented to [facility] with Dizziness, nausea and vomiting.  PMHx significant for hypertension.  She reports acute onset dizziness this morning ~0830 when she sat up in bed. She states she thought it was due to her sitting up too quickly, but she had three other similar episodes throughout the day. She reports the \"room spinning\" with movement and sitting up with intermittent episodes of N/V. She does complain of mild anterior headache. She denies any vision changes, facial or extremity numbness, weakness, chest pain, shortness of breath, or abdominal pain. She denies any recent illnesses. She states she has never experienced anything like thi before. NIH score 0 on examination.     A stroke alert was called in ED and completed. Chest x-ray is non-acute. CT head normal.  CTA head and neck unremarkable. EKG showed Troponin 12, CBC normal, CMP K+3.4. Glucose 118. Agree with admission for Neurology evaluation and MRI brain to r/lo stroke.     Assessment/Plan:      Dizziness  Nausea/vomiting   She reports acute onset dizziness this morning ~0830 when she sat up in bed. She states she thought it was due to her sitting up too quickly, but she had three other similar episodes throughout the day. She reports the \"room spinning\" with movement and sitting up with intermittent episodes of N/V.   She reports some improvement of dizziness after a dose Zofran/Antivert  CT head normal. CTA head and neck unremarkable  Neurology consulted, appreciate assistance   MRI brain without contrast ordered  NIHSS Q4  ASA, statin ordered for 2nd

## 2025-06-15 NOTE — PROGRESS NOTES
4 Eyes Skin Assessment and Patient belongings     The patient is being assess for  Admission    I agree that 2 Nurses have performed a thorough Head to Toe Skin Assessment on the patient. ALL assessment sites listed below have been assessed.       Areas assessed by both nurses:   [x]   Head, Face, and Ears   [x]   Shoulders, Back, and Chest  [x]   Arms, Elbows, and Hands   [x]   Coccyx, Sacrum, and IschIum  [x]   Legs, Feet, and Heels        Does the Patient have Skin Breakdown?  No         Cruz Prevention initiated:  No   Wound Care Orders initiated:  No      [facility] nurse consulted for Pressure Injury (Stage 3,4, Unstageable, DTI, NWPT, and Complex wounds), New and Established Ostomies:  No      I agree that 2 Nurses have reviewed patient belongings with the patient/family and documented in the flowsheet upon admission or transfer to the unit.     Belongings  Dental Appliances: None  Vision - Corrective Lenses: None  Hearing Aid: None  Clothing: Pants, Footwear, Shirt, Jacket/Coat, Undergarments  Jewelry: None  Electronic Devices: Cell Phone  Weapons (Notify Protective Services/Security): None  Home Medications: None  Valuables Given To: Patient  Provide Name(s) of Who Valuable(s) Were Given To: Irma Cavanaugh       Nurse 1 eSignature: Electronically signed by Yane Hdez RN on 6/15/25 at 12:34 AM EDT    **SHARE this note so that the co-signing nurse is able to place an eSignature**    Nurse 2 eSignature: Electronically signed by KATLIN SCOTT RN on 6/15/25 at 1:01 AM EDT

## 2025-06-15 NOTE — ED NOTES
Irma Cavanaugh is a 49 y.o. female admitted for  Principal Problem:    Dizziness  Resolved Problems:    * No resolved hospital problems. *  .   Patient Home via self with   Chief Complaint   Patient presents with    Dizziness     Pt reports she woke up around 0830 with dizziness. Pt states it has continued throughout the day. Pt states this has never happened before. Pt also states she has been vomiting due to the dizziness. Glucose 115   .  Patient is alert and Person, Place, Time, and Situation  Patient's baseline mobility: Baseline Mobility: Independent   Code Status: No Order   Cardiac Rhythm:       Is patient on baseline Oxygen: no how many Liters:   Abnormal Assessment Findings: pt complains of dizziness, feels like the room is spinning around her. Started at 0830 this morning. Pt complains of headache and nausea as well.    Isolation: None      NIH Score:    C-SSRS: Risk of Suicide: No Risk  Bedside swallow:        Active LDA's:   Peripheral IV 06/14/25 Right Antecubital (Active)     Patient admitted with a harrington: no If the harrington is chronic was it exchanged:NA  Reason for harrington:   Patient admitted with Central Line:  . PICC line placement confirmed: YES OR NO:[id number]}   Reason for Central line:   Was central line Inserted from an outside facility:        Family/Caregiver Present no Any Concerns: no   Restraints   Sitter          Vitals:      Vitals:    06/14/25 2100 06/14/25 2115 06/14/25 2130 06/14/25 2145   BP: (!) 152/89 (!) 144/87 137/76 137/76   Pulse: 71 85 89 86   Resp: 14 16 20 21   Temp:       TempSrc:       SpO2: 95% 94% 93% 94%   Weight:       Height:           Last documented pain score (0-10 scale) Pain Level: 5  Pain medication administered No.    Pertinent or High Risk Medications/Drips: No.    Pending Blood Product Administration: no    Abnormal labs:   Abnormal Labs Reviewed   COMPREHENSIVE METABOLIC PANEL W/ REFLEX TO MG FOR LOW K - Abnormal; Notable for the following components:

## 2025-06-15 NOTE — PROGRESS NOTES
Occupational Therapy  Facility/Department: [place] [address] - MED SURG  Occupational Therapy Initial Assessment/Discharge    Name: Irma Cavanaugh  : 1975  MRN: [id number]  Date of Service: 6/15/2025    Discharge Recommendations:  Home with assist PRN  OT Equipment Recommendations  Equipment Needed: No       Patient Diagnosis(es): The primary encounter diagnosis was Dizziness. A diagnosis of Nausea and vomiting, unspecified vomiting type was also pertinent to this visit.  Past Medical History:  has a past medical history of Hypertension.  Past Surgical History:  has a past surgical history that includes [place] tooth extraction.           Assessment  Assessment: Patient tolerated initial evaluation/treatment well. Patient presented to [facility] with dizziness and concern for stroke. Typically patient completes ADL tasks with out assistance and IADLs with out assistance. Today pt required no physical assistance for ADLs and ambulation. Mild horizontal nystagmus sand saccades deficits noted as described below however no c/op dizziness from patient. Education provided on eye strengthening activities. No acute OT needs at this time and patient is safe to discharge from OT services. Please re refer if new needs arise.       Prognosis: Good  Decision Making: Low Complexity  REQUIRES OT FOLLOW-UP: No  Activity Tolerance  Activity Tolerance: Patient Tolerated treatment well     Plan  Occupational Therapy Plan  Times Per Week: DC from OT    Restrictions  Restrictions/Precautions  Restrictions/Precautions: General Precautions  Activity Level: Up as Tolerated  Position Activity Restriction  Other Position/Activity Restrictions: IV    Subjective  General  Chart Reviewed: Yes  Patient assessed for rehabilitation services?: Yes  Family / Caregiver Present: No  Referring Practitioner: Daisy Martinez MD  Diagnosis: Dizziness; MRI pending; HCT (-)  General Comment  Comments: Patient lying in bed upon arrival and agreeable to therapy -

## 2025-06-15 NOTE — PROGRESS NOTES
Physical Therapy  Facility/Department: [place] [address] - MED SURG  Physical Therapy Initial Assessment & Discharge Summary    Name: Irma Cavanaugh  : 1975  MRN: [id number]  Date of Service: 6/15/2025    Discharge Recommendations:  Home with assist PRN, Outpatient PT (vestibular PT)   PT Equipment Recommendations  Equipment Needed: No      Patient Diagnosis(es): The primary encounter diagnosis was Dizziness. A diagnosis of Nausea and vomiting, unspecified vomiting type was also pertinent to this visit.  Past Medical History:  has a past medical history of Hypertension.  Past Surgical History:  has a past surgical history that includes [place] tooth extraction.    Assessment  Assessment: Pt is awake and agreeable to therapy session. She is admitted to [facility] w/ dizziness. Pt states that when she lifted her head off pillow at home, she had a room spinning sensation. On exam, same positioning does not reproduce symptoms. Head turns in supine as well as standing also do not reproduce symptoms. Therapist educated on signs/symptoms of BPPV as what she is describing for her initial mechanism is consistent with, however no further testing done at this time since all symptoms have resolved. Will recommend follow up with outpatient vestibular therapy. She is independent with mobility and reports feeling back to baseline mobility. Due to no acute needs, she is discharged from acute PT at this time.  Therapy Prognosis: Excellent  Decision Making: Low Complexity  Requires PT Follow-Up: No  Activity Tolerance  Activity Tolerance: Patient tolerated evaluation without incident      Plan  Physical Therapy Plan  General Plan: Discharge with evaluation only  Safety Devices  Type of Devices: Left in chair, All fall risk precautions in place, Nurse notified, Call light within reach  Restraints  Restraints Initially in Place: No    Restrictions  Restrictions/Precautions  Restrictions/Precautions: General Precautions  Activity Level: Up as

## 2025-06-15 NOTE — PLAN OF CARE
TODAY'S DATE:  6/15/2025      Current NIHSS 0      Discussed personal risk factors for Stroke/TIA with patient/family, and ways to reduce the risk for a recurrent stroke.     Patient's personal risk factors which were identified are:   []   Alcohol Abuse: check with your physician before any alcohol consumption.   []   Atrial fibrillation: may cause blood clots  []   Drug Abuse: Seek help, talk with your doctor  []   Clotting Disorder  []   Diabetes  []   Family history of stroke or heart disease  [x]   High Blood Pressure/Hypertension: work with your physician  []   High cholesterol: monitor cholesterol levels with your physician  [x]   Overweight/Obesity: work with your physician for your ideal body weight  []   Physical Inactivity: get regular exercise as directed by your physician  []   Personal history of previous TIA or stroke  [x]   Poor Diet: decrease salt (sodium) in your diet, follow diet directed by physician  []   Smoking: stop smoking      Reviewed the Following Education with Patient and/or Family:   - Signs and Symptoms of Stroke  - Personal risk factors   - How to activate EMS (911)   - Importance of Follow Up Appointments at Discharge   - Importance of Compliance with Medications Prescribed at Discharge  - Available community resources and stroke advocacy groups if needed    Patient and/or family member: verbalized understanding.     Stroke Education booklet given to patient/family (or verified, if given already), which reviews above information. yes         Electronically signed by Yane Hdez RN on 6/15/2025 at 6:32 AM

## 2025-06-15 NOTE — PLAN OF CARE
SLP Evaluation Completed. All note are found in EMR    Parul Collins MA, CCC-SLP  Speech Pathologist  [id number]

## 2025-06-16 VITALS
RESPIRATION RATE: 18 BRPM | TEMPERATURE: 98.1 F | HEIGHT: 66 IN | HEART RATE: 78 BPM | OXYGEN SATURATION: 100 % | DIASTOLIC BLOOD PRESSURE: 73 MMHG | SYSTOLIC BLOOD PRESSURE: 129 MMHG | BODY MASS INDEX: 36 KG/M2 | WEIGHT: 224 LBS

## 2025-06-16 PROBLEM — H81.10 BENIGN PAROXYSMAL POSITIONAL VERTIGO: Status: ACTIVE | Noted: 2025-06-16

## 2025-06-16 PROCEDURE — G0378 HOSPITAL OBSERVATION PER HR: HCPCS

## 2025-06-16 PROCEDURE — 6370000000 HC RX 637 (ALT 250 FOR IP): Performed by: STUDENT IN AN ORGANIZED HEALTH CARE EDUCATION/TRAINING PROGRAM

## 2025-06-16 PROCEDURE — 6370000000 HC RX 637 (ALT 250 FOR IP): Performed by: NURSE PRACTITIONER

## 2025-06-16 PROCEDURE — 99222 1ST HOSP IP/OBS MODERATE 55: CPT | Performed by: STUDENT IN AN ORGANIZED HEALTH CARE EDUCATION/TRAINING PROGRAM

## 2025-06-16 PROCEDURE — APPSS45 APP SPLIT SHARED TIME 31-45 MINUTES

## 2025-06-16 PROCEDURE — 2500000003 HC RX 250 WO HCPCS: Performed by: STUDENT IN AN ORGANIZED HEALTH CARE EDUCATION/TRAINING PROGRAM

## 2025-06-16 RX ORDER — LOSARTAN POTASSIUM 25 MG/1
50 TABLET ORAL DAILY
COMMUNITY
Start: 2025-06-16

## 2025-06-16 RX ORDER — MECLIZINE HCL 12.5 MG 12.5 MG/1
12.5 TABLET ORAL 3 TIMES DAILY PRN
Qty: 30 TABLET | Refills: 0 | Status: SHIPPED | OUTPATIENT
Start: 2025-06-16 | End: 2025-06-26

## 2025-06-16 RX ADMIN — ASPIRIN 81 MG: 81 TABLET, CHEWABLE ORAL at 09:10

## 2025-06-16 RX ADMIN — AMLODIPINE BESYLATE 5 MG: 5 TABLET ORAL at 09:10

## 2025-06-16 RX ADMIN — METOPROLOL SUCCINATE 25 MG: 25 TABLET, EXTENDED RELEASE ORAL at 09:10

## 2025-06-16 RX ADMIN — LOSARTAN POTASSIUM 50 MG: 25 TABLET, FILM COATED ORAL at 09:10

## 2025-06-16 RX ADMIN — Medication 10 ML: at 09:12

## 2025-06-16 NOTE — DISCHARGE SUMMARY
Hospital Medicine Discharge Summary    Patient: Irma Cavanaugh   : 1975     Hospital:  [facility]  Admit Date: 2025   Discharge Date:   2025  Disposition:  Home   Code status:  Full  Condition at Discharge: Stable  Primary Care Provider: Cheyenne Garcia MD    Admitting Provider: Daisy Martinez MD  Discharge Provider: Sarah Green, APRN - CNP     Discharge Diagnoses:      Active Hospital Problems    Diagnosis     Dizziness [R42]        Presenting Admission History:      49 y.o. female who presented to [facility] with Dizziness, nausea and vomiting.  PMHx significant for hypertension.  She reports acute onset dizziness this morning ~0830 when she sat up in bed. She states she thought it was due to her sitting up too quickly, but she had three other similar episodes throughout the day. She reports the \"room spinning\" with movement and sitting up with intermittent episodes of N/V. She does complain of mild anterior headache. She denies any vision changes, facial or extremity numbness, weakness, chest pain, shortness of breath, or abdominal pain. She denies any recent illnesses. She states she has never experienced anything like thi before. NIH score 0 on examination.      A stroke alert was called in ED and completed. Chest x-ray is non-acute. CT head normal.  CTA head and neck unremarkable. EKG showed Troponin 12, CBC normal, CMP K+3.4. Glucose 118. Agree with admission for Neurology evaluation and MRI brain to r/lo stroke.      Assessment/Plan:       Dizziness possibly BPPV, rulied out TIA and  CVA  Nausea/vomiting, resolved  - CT head normal.  - CTA head and neck unremarkable  - MRI brain revealed no acute process  - Will stop ASA, and Statin at d/c   - Lipid panel ordered but never obtained this admission. Last lipid panel  CHOL 141, HDL 36, LDL 86, Trigs 99  - A1C, TSH, & vitamin B12 ordered and still pending at discharge  - PT/OT/ST

## 2025-06-16 NOTE — CONSULTS
Irma Cavanaugh  Inpatient Neurology Consult  [facility] Neurology            Irma Cavanaugh  1975    Date of Service: 6/16/2025    Referring Physician: Delano Suarez,*      Reason for the consult and CC: Dizziness, CVA eval    HPI:   The patient is a 49 y.o.  years old female with a prior medical history of hypertension who presented to the hospital on 6/14 with dizziness.  Patient states that she woke up on Saturday morning and when she sat up in bed she briefly felt like the room was spinning for a few seconds.  She says it resolved, and she went on with her day and then she had 3-4 more similar spells throughout the day.  These were all associated with position changes and consisted of room spinning for a few seconds.  She says that she also developed vomiting.  She denies any speech difficulty, numbness, tingling, or weakness.  She has never had symptoms like this before.  CT head, CTA head/neck revealed no acute findings.  MRI brain revealed no acute findings.  [place] was positive bilaterally.       Constitutional:   Vitals:    06/16/25 0614 06/16/25 0910 06/16/25 1018 06/16/25 1100   BP: 121/78 139/86  129/73   Pulse: 65 80 75 78   Resp: 18 18  18   Temp: 98.4 °F (36.9 °C) 98.2 °F (36.8 °C)  98.1 °F (36.7 °C)   TempSrc: Oral   Oral   SpO2: 97% 100%  100%   Weight:       Height:             I personally reviewed and updated social history, past medical history, medications, allergy, surgical history, and family history as documented in the patient's electronic health records.       ROS: 10-14 ROS reviewed with the patient/nurse/family which were unremarkable except mentioned in H&P.    General appearance:  Normal development and appear in no acute distress.   Mental Status:   Oriented to person, place, problem, and time.    Memory: Good immediate recall.  Intact remote memory  Normal attention span and concentration.  Language: intact naming, repeating and fluency   Good fund of

## 2025-06-16 NOTE — PLAN OF CARE
Problem: Discharge Planning  Goal: Discharge to home or other facility with appropriate resources  6/16/2025 1125 by Ac Flores, RN  Outcome: Progressing

## 2025-06-16 NOTE — PLAN OF CARE
Problem: Discharge Planning  Goal: Discharge to home or other facility with appropriate resources  6/16/2025 1608 by Ac Flores, RN  Outcome: Adequate for Discharge  6/16/2025 1125 by Ac Flores, RN  Outcome: Progressing     Problem: Pain  Goal: Verbalizes/displays adequate comfort level or baseline comfort level  Outcome: Adequate for Discharge     Problem: ABCDS Injury Assessment  Goal: Absence of physical injury  Outcome: Adequate for Discharge     Problem: Gastrointestinal - Adult  Goal: Minimal or absence of nausea and vomiting  Outcome: Adequate for Discharge

## 2025-06-16 NOTE — CARE COORDINATION
Case Management Assessment  Initial Evaluation    Date/Time of Evaluation: 6/16/2025 10:08 AM  Assessment Completed by: Olivia Chase RN    If patient is discharged prior to next notation, then this note serves as note for discharge by case management.    Patient Name: Irma Cavanaugh                   YOB: 1975  Diagnosis: Dizziness [R42]  Nausea and vomiting, unspecified vomiting type [R11.2]                   Date / Time: 6/14/2025  5:29 PM    Patient Admission Status: Inpatient   Readmission Risk (Low < 19, Mod (19-27), High > 27): Readmission Risk Score: 5.5    Current PCP: Cheyenne Garcia MD  PCP verified by CM? Yes    Chart Reviewed: Yes      History Provided by: Patient  Patient Orientation: Alert and Oriented    Patient Cognition: Alert    Hospitalization in the last 30 days (Readmission):  No    If yes, Readmission Assessment in CM Navigator will be completed.    Advance Directives:      Code Status: Full Code   Patient's Primary Decision Maker is: Legal Next of Kin      Discharge Planning:    Patient lives with: Spouse/Significant Other Type of Home: House  Primary Care Giver: Self  Patient Support Systems include: Spouse/Significant Other   Current Financial resources: Other (Comment) (commercial)  Current community resources: None  Current services prior to admission: None            Current DME:              Type of Home Care services:  None    ADLS  Prior functional level: Independent in ADLs/IADLs  Current functional level: Independent in ADLs/IADLs    PT AM-PAC: 24 /24  OT AM-PAC: 24 /24    Family can provide assistance at DC: Yes  Would you like Case Management to discuss the discharge plan with any other family members/significant others, and if so, who? No  Plans to Return to Present Housing: Yes  Other Identified Issues/Barriers to RETURNING to current housing:   Potential Assistance needed at discharge: N/A            Potential DME:    Patient expects to discharge

## 2025-06-16 NOTE — PLAN OF CARE
Problem: Discharge Planning  Goal: Discharge to home or other facility with appropriate resources  6/15/2025 2226 by Yane Hdez RN  Outcome: Progressing     Problem: Pain  Goal: Verbalizes/displays adequate comfort level or baseline comfort level  6/15/2025 2226 by Yane Hdez RN  Outcome: Progressing     Problem: ABCDS Injury Assessment  Goal: Absence of physical injury  6/15/2025 2226 by Yane Hdez RN  Outcome: Progressing     Problem: Gastrointestinal - Adult  Goal: Minimal or absence of nausea and vomiting  6/15/2025 2226 by Yane Hdez RN  Outcome: Progressing

## 2025-06-16 NOTE — PROGRESS NOTES
[facility] Medicine Progress Note  V 5.17      Date of Admission: 6/14/2025    Hospital Day: 3      Chief Admission Complaint:    Dizziness (Pt reports she woke up around 0830 with dizziness. Pt states it has continued throughout the day. Pt states this has never happened before. Pt also states she has been vomiting due to the dizziness. Glucose 115)    Subjective:     Patient lying in bed. She tells me she has had no dizziness, N/V or HA since admission.     Presenting Admission History:       49 y.o. female who presented to [facility] with Dizziness, nausea and vomiting.  PMHx significant for hypertension.  She reports acute onset dizziness this morning ~0830 when she sat up in bed. She states she thought it was due to her sitting up too quickly, but she had three other similar episodes throughout the day. She reports the \"room spinning\" with movement and sitting up with intermittent episodes of N/V. She does complain of mild anterior headache. She denies any vision changes, facial or extremity numbness, weakness, chest pain, shortness of breath, or abdominal pain. She denies any recent illnesses. She states she has never experienced anything like thi before. NIH score 0 on examination.      A stroke alert was called in ED and completed. Chest x-ray is non-acute. CT head normal.  CTA head and neck unremarkable. EKG showed Troponin 12, CBC normal, CMP K+3.4. Glucose 118. Agree with admission for Neurology evaluation and MRI brain to r/lo stroke.      Assessment/Plan:       Dizziness possibly BPPV, ruling out TIA vs CVA  Nausea/vomiting, resolved  - CT head normal.  - CTA head and neck unremarkable  - MRI brain ordered and pending  - Continue ASA, and Statin for 2nd prevention and risk reduction   - Lipid panel, A1C, TSH, vitamin B12, folate ordered and pending  - PT/OT/ST evaluation  - Orthostatic negative  - Continue Antivert TID PRN  - Continue telemetry monitoring    -Neurology consult

## 2025-07-01 ENCOUNTER — HOSPITAL ENCOUNTER (OUTPATIENT)
Dept: PHYSICAL THERAPY | Age: 50
Setting detail: THERAPIES SERIES
Discharge: HOME | End: 2025-07-01
Payer: COMMERCIAL

## 2025-07-01 DIAGNOSIS — R42 DIZZINESS: Primary | ICD-10-CM

## 2025-07-01 PROCEDURE — 95992 CANALITH REPOSITIONING PROC: CPT

## 2025-07-01 PROCEDURE — 97161 PT EVAL LOW COMPLEX 20 MIN: CPT

## 2025-07-01 PROCEDURE — 97112 NEUROMUSCULAR REEDUCATION: CPT

## 2025-07-01 NOTE — PLAN OF CARE
[facility] - Outpatient Rehabilitation and Therapy: [address] [place] [address] office: [phone] fax: [phone]     Physical Therapy Initial Evaluation Certification      Dear Sarah Blunt*,    We had the pleasure of evaluating the following patient for physical therapy services at [facility].  A summary of our findings can be found in the initial assessment below.  This includes our plan of care.  If you have any questions or concerns regarding these findings, please do not hesitate to contact me at the office phone number listed above.  Thank you for the referral.     Physician Signature:_______________________________Date:__________________  By signing above (or electronic signature), therapist’s plan is approved by physician       Physical Therapy: TREATMENT/PROGRESS NOTE   Patient: Irma Cavanaugh (49 y.o. female)   Examination Date: 2025   :  1975 MRN: [id number]   Visit #: 1   Insurance Allowable Auth Needed   Unknown - no EOB in system []Yes    []No    Insurance: Payor: [employer] / Plan: [facility] SHARED SERVICES / Product Type: *No Product type* /   Insurance ID: [id number] - (Commercial)  Secondary Insurance (if applicable):    Treatment Diagnosis:     ICD-10-CM    1. Dizziness  R42          Medical Diagnosis:  Dizziness [R42]  Benign paroxysmal positional vertigo, unspecified laterality [H81.10]   Referring Physician: Sarah Green*  PCP: Cheyenne Garcia MD       Plan of care signed (Y/N):     Date of Patient follow up with Physician:      Plan of Care Report: EVAL today  POC update due: (10 visits /OR AUTH LIMITS, whichever is less)  2025                                             Medical History:  Comorbidities:  Hypertension  Relevant Medical History: The patient is a 49 y.o.  years old female with a prior medical history of hypertension who presented to the hospital on  with